# Patient Record
Sex: FEMALE | Race: WHITE | Employment: OTHER | URBAN - METROPOLITAN AREA
[De-identification: names, ages, dates, MRNs, and addresses within clinical notes are randomized per-mention and may not be internally consistent; named-entity substitution may affect disease eponyms.]

---

## 2017-01-23 ENCOUNTER — OFFICE VISIT (OUTPATIENT)
Dept: ORTHOPEDIC SURGERY | Age: 62
End: 2017-01-23

## 2017-01-23 VITALS
WEIGHT: 201.94 LBS | BODY MASS INDEX: 37.16 KG/M2 | DIASTOLIC BLOOD PRESSURE: 87 MMHG | HEIGHT: 62 IN | HEART RATE: 102 BPM | SYSTOLIC BLOOD PRESSURE: 111 MMHG

## 2017-01-23 DIAGNOSIS — M17.10 LOCALIZED OSTEOARTHROSIS, LOWER LEG: Primary | ICD-10-CM

## 2017-01-23 DIAGNOSIS — Z96.651 STATUS POST TOTAL RIGHT KNEE REPLACEMENT: ICD-10-CM

## 2017-01-23 PROCEDURE — 73560 X-RAY EXAM OF KNEE 1 OR 2: CPT | Performed by: ORTHOPAEDIC SURGERY

## 2017-01-23 PROCEDURE — 99212 OFFICE O/P EST SF 10 MIN: CPT | Performed by: ORTHOPAEDIC SURGERY

## 2017-05-01 ENCOUNTER — HOSPITAL ENCOUNTER (OUTPATIENT)
Dept: OTHER | Age: 62
Discharge: HOME | End: 2017-05-01
Attending: INTERNAL MEDICINE | Admitting: INTERNAL MEDICINE

## 2017-05-01 LAB
A/G RATIO: 1.3 (ref 1.1–2.2)
ALBUMIN SERPL-MCNC: 4.3 G/DL (ref 3.4–5)
ALP BLD-CCNC: 133 U/L (ref 40–129)
ALT SERPL-CCNC: 24 U/L (ref 10–40)
ANION GAP SERPL CALCULATED.3IONS-SCNC: 15 MMOL/L (ref 3–16)
AST SERPL-CCNC: 17 U/L (ref 15–37)
BILIRUB SERPL-MCNC: 0.3 MG/DL (ref 0–1)
BUN BLDV-MCNC: 9 MG/DL (ref 7–20)
CALCIUM SERPL-MCNC: 9.8 MG/DL (ref 8.3–10.6)
CHLORIDE BLD-SCNC: 104 MMOL/L (ref 99–110)
CHOLESTEROL, TOTAL: 214 MG/DL (ref 0–199)
CO2: 24 MMOL/L (ref 21–32)
CREAT SERPL-MCNC: 1 MG/DL (ref 0.6–1.1)
GFR AFRICAN AMERICAN: >60
GFR NON-AFRICAN AMERICAN: 57
GLOBULIN: 3.2 G/DL
GLUCOSE BLD-MCNC: 101 MG/DL (ref 70–99)
HCT VFR BLD CALC: 41.9 % (ref 36–48)
HDLC SERPL-MCNC: 55 MG/DL (ref 40–60)
HEMOGLOBIN: 13.7 G/DL (ref 12–16)
LDL CHOLESTEROL CALCULATED: 141 MG/DL
MCH RBC QN AUTO: 28 PG (ref 26–34)
MCHC RBC AUTO-ENTMCNC: 32.8 G/DL (ref 31–36)
MCV RBC AUTO: 85.4 FL (ref 80–100)
PDW BLD-RTO: 15 % (ref 12.4–15.4)
PLATELET # BLD: 232 K/UL (ref 135–450)
PMV BLD AUTO: 10.3 FL (ref 5–10.5)
POTASSIUM SERPL-SCNC: 4.2 MMOL/L (ref 3.5–5.1)
RBC # BLD: 4.91 M/UL (ref 4–5.2)
SODIUM BLD-SCNC: 143 MMOL/L (ref 136–145)
TOTAL PROTEIN: 7.5 G/DL (ref 6.4–8.2)
TRIGL SERPL-MCNC: 88 MG/DL (ref 0–150)
TSH SERPL DL<=0.05 MIU/L-ACNC: 1.03 UIU/ML (ref 0.27–4.2)
VITAMIN B-12: 344 PG/ML (ref 211–911)
VLDLC SERPL CALC-MCNC: 18 MG/DL
WBC # BLD: 10.9 K/UL (ref 4–11)

## 2017-08-14 ENCOUNTER — HOSPITAL ENCOUNTER (OUTPATIENT)
Dept: OTHER | Age: 62
Discharge: HOME | End: 2017-08-14

## 2017-08-14 DIAGNOSIS — M25.511 RIGHT SHOULDER PAIN, UNSPECIFIED CHRONICITY: ICD-10-CM

## 2018-02-22 ENCOUNTER — OFFICE VISIT (OUTPATIENT)
Dept: ORTHOPEDIC SURGERY | Age: 63
End: 2018-02-22

## 2018-02-22 VITALS
HEART RATE: 93 BPM | BODY MASS INDEX: 37.16 KG/M2 | HEIGHT: 62 IN | WEIGHT: 201.94 LBS | DIASTOLIC BLOOD PRESSURE: 97 MMHG | SYSTOLIC BLOOD PRESSURE: 160 MMHG

## 2018-02-22 DIAGNOSIS — M25.561 ACUTE PAIN OF RIGHT KNEE: Primary | ICD-10-CM

## 2018-02-22 DIAGNOSIS — M25.661 KNEE STIFFNESS, RIGHT: ICD-10-CM

## 2018-02-22 PROCEDURE — G8484 FLU IMMUNIZE NO ADMIN: HCPCS | Performed by: ORTHOPAEDIC SURGERY

## 2018-02-22 PROCEDURE — G8427 DOCREV CUR MEDS BY ELIG CLIN: HCPCS | Performed by: ORTHOPAEDIC SURGERY

## 2018-02-22 PROCEDURE — 1036F TOBACCO NON-USER: CPT | Performed by: ORTHOPAEDIC SURGERY

## 2018-02-22 PROCEDURE — G8417 CALC BMI ABV UP PARAM F/U: HCPCS | Performed by: ORTHOPAEDIC SURGERY

## 2018-02-22 PROCEDURE — 3017F COLORECTAL CA SCREEN DOC REV: CPT | Performed by: ORTHOPAEDIC SURGERY

## 2018-02-22 PROCEDURE — 99213 OFFICE O/P EST LOW 20 MIN: CPT | Performed by: ORTHOPAEDIC SURGERY

## 2018-02-22 PROCEDURE — 3014F SCREEN MAMMO DOC REV: CPT | Performed by: ORTHOPAEDIC SURGERY

## 2018-02-22 NOTE — PROGRESS NOTES
History  Kimi Boggs is a 63 y.o. female who returns for follow-up of a right total knee arthroplasty. Patient had some residual pain following her total knee arthroplasty was seen Dr. Gabe Cronin who did a geniculate nerve block 1/15/18. She reports that following that injection she is had a sensation of heaviness and limited motion in the knee with a level 10/10 pain and has had a difficult time maintaining motion in the joint. Any type of movement exacerbates her symptoms nothing at the present time is improving her discomfort. She reports that over the past year she's lost 55 pounds and was in an aggressive rehab program engaging and lunges and squats and was feeling quite good until the time of the injection. He seen today in follow-up for evaluation and treatment. .   Symptoms are has significantly worsened. Pain level today is 10/10. Treatment to date includes previous total knee arthroplasty with physical therapy. .      Patient's medications, allergies, past medical, surgical, social and family histories were reviewed and updated as appropriate. Review of Systems  Relevant review of systems reviewed and available in the patient's chart    Primary Area of CC: Examination the reveals a well-healed surgical scar in the midline. Passively I bring her up to full extension and flexion are flexor to about 115°. There is good stability medially and laterally. Patient reports that with extension she feels the pain primarily posteriorly radiating down the leg into the foot. I do not detect significant stiffness with passive movement the patient actively is having a difficult time with initiating motion. Examination proximal and distal to the injured area show good overall ROM, no bony prominence or soft tissue tenderness, no instability or excessive stiffness.             Radiology:     X-rays obtained and reviewed in office:  Views AP lateral skyline  Location right knee  Impression there is

## 2018-02-22 NOTE — PATIENT INSTRUCTIONS
you can put on your leg. Use a cane, crutches, or a walker as instructed. · Follow your doctor's instructions about activity during your healing process. If you can do mild exercise, slowly increase your activity. · Reach and stay at a healthy weight. Extra weight can strain the joints, especially the knees and hips, and make the pain worse. Losing even a few pounds may help. When should you call for help? Call 911 anytime you think you may need emergency care. For example, call if:  ? · You have symptoms of a blood clot in your lung (called a pulmonary embolism). These may include:  ¨ Sudden chest pain. ¨ Trouble breathing. ¨ Coughing up blood. ?Call your doctor now or seek immediate medical care if:  ? · You have severe or increasing pain. ? · Your leg or foot turns cold or changes color. ? · You cannot stand or put weight on your knee. ? · Your knee looks twisted or bent out of shape. ? · You cannot move your knee. ? · You have signs of infection, such as:  ¨ Increased pain, swelling, warmth, or redness. ¨ Red streaks leading from the knee. ¨ Pus draining from a place on your knee. ¨ A fever. ? · You have signs of a blood clot in your leg (called a deep vein thrombosis), such as:  ¨ Pain in your calf, back of the knee, thigh, or groin. ¨ Redness and swelling in your leg or groin. ? Watch closely for changes in your health, and be sure to contact your doctor if:  ? · You have tingling, weakness, or numbness in your knee. ? · You have any new symptoms, such as swelling. ? · You have bruises from a knee injury that last longer than 2 weeks. ? · You do not get better as expected. Where can you learn more? Go to https://[employer]. org and sign in to your [employer] account. Enter K195 in the Query Hunter box to learn more about \"Knee Pain or Injury: Care Instructions. \"     If you do not have an account, please click on the \"Sign Up Now\" link.   Current as of:

## 2018-03-01 ENCOUNTER — HOSPITAL ENCOUNTER (OUTPATIENT)
Dept: PHYSICAL THERAPY | Age: 63
Discharge: HOME | End: 2018-03-31
Attending: ORTHOPAEDIC SURGERY | Admitting: ORTHOPAEDIC SURGERY

## 2019-07-10 ENCOUNTER — OFFICE VISIT (OUTPATIENT)
Dept: ORTHOPEDIC SURGERY | Age: 64
End: 2019-07-10

## 2019-07-10 VITALS
HEART RATE: 71 BPM | SYSTOLIC BLOOD PRESSURE: 119 MMHG | HEIGHT: 62 IN | WEIGHT: 201.94 LBS | BODY MASS INDEX: 37.16 KG/M2 | DIASTOLIC BLOOD PRESSURE: 67 MMHG

## 2019-07-10 DIAGNOSIS — M17.12 PRIMARY OSTEOARTHRITIS OF LEFT KNEE: Primary | ICD-10-CM

## 2019-07-10 DIAGNOSIS — R52 PAIN: ICD-10-CM

## 2019-07-10 PROCEDURE — 99213 OFFICE O/P EST LOW 20 MIN: CPT | Performed by: ORTHOPAEDIC SURGERY

## 2019-07-10 PROCEDURE — 20610 DRAIN/INJ JOINT/BURSA W/O US: CPT | Performed by: ORTHOPAEDIC SURGERY

## 2020-08-27 ENCOUNTER — HOSPITAL ENCOUNTER (INPATIENT)
Age: 65
LOS: 3 days | Discharge: HOME | DRG: 872 | End: 2020-08-30
Attending: EMERGENCY MEDICINE | Admitting: INTERNAL MEDICINE
Payer: MEDICARE

## 2020-08-27 ENCOUNTER — APPOINTMENT (OUTPATIENT)
Dept: GENERAL RADIOLOGY | Age: 65
DRG: 872 | End: 2020-08-27
Payer: MEDICARE

## 2020-08-27 ENCOUNTER — APPOINTMENT (OUTPATIENT)
Dept: CT IMAGING | Age: 65
DRG: 872 | End: 2020-08-27
Payer: MEDICARE

## 2020-08-27 LAB
A/G RATIO: 1.4 (ref 1.1–2.2)
ALBUMIN SERPL-MCNC: 4.3 G/DL (ref 3.4–5)
ALP BLD-CCNC: 112 U/L (ref 40–129)
ALT SERPL-CCNC: 11 U/L (ref 10–40)
ANION GAP SERPL CALCULATED.3IONS-SCNC: 12 MMOL/L (ref 3–16)
AST SERPL-CCNC: 10 U/L (ref 15–37)
BASOPHILS ABSOLUTE: 0 K/UL (ref 0–0.2)
BASOPHILS RELATIVE PERCENT: 0 %
BILIRUB SERPL-MCNC: 0.8 MG/DL (ref 0–1)
BILIRUBIN URINE: NEGATIVE
BLOOD, URINE: ABNORMAL
BUN BLDV-MCNC: 8 MG/DL (ref 7–20)
CALCIUM SERPL-MCNC: 10 MG/DL (ref 8.3–10.6)
CHLORIDE BLD-SCNC: 98 MMOL/L (ref 99–110)
CLARITY: ABNORMAL
CO2: 26 MMOL/L (ref 21–32)
COLOR: YELLOW
CREAT SERPL-MCNC: 0.9 MG/DL (ref 0.6–1.2)
EKG ATRIAL RATE: 108 BPM
EKG DIAGNOSIS: NORMAL
EKG P AXIS: 55 DEGREES
EKG P-R INTERVAL: 166 MS
EKG Q-T INTERVAL: 316 MS
EKG QRS DURATION: 78 MS
EKG QTC CALCULATION (BAZETT): 423 MS
EKG R AXIS: 53 DEGREES
EKG T AXIS: 23 DEGREES
EKG VENTRICULAR RATE: 108 BPM
EOSINOPHILS ABSOLUTE: 0 K/UL (ref 0–0.6)
EOSINOPHILS RELATIVE PERCENT: 0 %
EPITHELIAL CELLS, UA: ABNORMAL /HPF (ref 0–5)
GFR AFRICAN AMERICAN: >60
GFR NON-AFRICAN AMERICAN: >60
GLOBULIN: 3 G/DL
GLUCOSE BLD-MCNC: 187 MG/DL (ref 70–99)
GLUCOSE URINE: NEGATIVE MG/DL
HCT VFR BLD CALC: 44.2 % (ref 36–48)
HEMOGLOBIN: 14.7 G/DL (ref 12–16)
HYALINE CASTS: ABNORMAL /LPF (ref 0–2)
KETONES, URINE: NEGATIVE MG/DL
LACTIC ACID, SEPSIS: 3.7 MMOL/L (ref 0.4–1.9)
LEUKOCYTE ESTERASE, URINE: NEGATIVE
LIPASE: 18 U/L (ref 13–60)
LYMPHOCYTES ABSOLUTE: 1.4 K/UL (ref 1–5.1)
LYMPHOCYTES RELATIVE PERCENT: 7 %
MCH RBC QN AUTO: 29.1 PG (ref 26–34)
MCHC RBC AUTO-ENTMCNC: 33.2 G/DL (ref 31–36)
MCV RBC AUTO: 87.7 FL (ref 80–100)
MICROSCOPIC EXAMINATION: YES
MONOCYTES ABSOLUTE: 1.4 K/UL (ref 0–1.3)
MONOCYTES RELATIVE PERCENT: 7 %
NEUTROPHILS ABSOLUTE: 16.6 K/UL (ref 1.7–7.7)
NEUTROPHILS RELATIVE PERCENT: 86 %
NITRITE, URINE: NEGATIVE
PDW BLD-RTO: 15 % (ref 12.4–15.4)
PH UA: 7.5 (ref 5–8)
PLATELET # BLD: 170 K/UL (ref 135–450)
PLATELET SLIDE REVIEW: ADEQUATE
PMV BLD AUTO: 10.2 FL (ref 5–10.5)
POTASSIUM REFLEX MAGNESIUM: 4.2 MMOL/L (ref 3.5–5.1)
PROTEIN UA: ABNORMAL MG/DL
RBC # BLD: 5.04 M/UL (ref 4–5.2)
RBC UA: ABNORMAL /HPF (ref 0–4)
SLIDE REVIEW: ABNORMAL
SODIUM BLD-SCNC: 136 MMOL/L (ref 136–145)
SPECIFIC GRAVITY UA: 1.01 (ref 1–1.03)
TOTAL PROTEIN: 7.3 G/DL (ref 6.4–8.2)
TROPONIN: <0.01 NG/ML
URINE TYPE: ABNORMAL
UROBILINOGEN, URINE: 0.2 E.U./DL
WBC # BLD: 19.3 K/UL (ref 4–11)
WBC UA: ABNORMAL /HPF (ref 0–5)
YEAST: PRESENT /HPF

## 2020-08-27 PROCEDURE — 6360000004 HC RX CONTRAST MEDICATION: Performed by: PHYSICIAN ASSISTANT

## 2020-08-27 PROCEDURE — 2580000003 HC RX 258: Performed by: NURSE PRACTITIONER

## 2020-08-27 PROCEDURE — 96375 TX/PRO/DX INJ NEW DRUG ADDON: CPT

## 2020-08-27 PROCEDURE — 93005 ELECTROCARDIOGRAM TRACING: CPT | Performed by: PHYSICIAN ASSISTANT

## 2020-08-27 PROCEDURE — 6360000002 HC RX W HCPCS: Performed by: NURSE PRACTITIONER

## 2020-08-27 PROCEDURE — 2580000003 HC RX 258: Performed by: EMERGENCY MEDICINE

## 2020-08-27 PROCEDURE — 99221 1ST HOSP IP/OBS SF/LOW 40: CPT | Performed by: NURSE PRACTITIONER

## 2020-08-27 PROCEDURE — 85025 COMPLETE CBC W/AUTO DIFF WBC: CPT

## 2020-08-27 PROCEDURE — 2500000003 HC RX 250 WO HCPCS: Performed by: NURSE PRACTITIONER

## 2020-08-27 PROCEDURE — 96365 THER/PROPH/DIAG IV INF INIT: CPT

## 2020-08-27 PROCEDURE — 71045 X-RAY EXAM CHEST 1 VIEW: CPT

## 2020-08-27 PROCEDURE — 6370000000 HC RX 637 (ALT 250 FOR IP): Performed by: PHYSICIAN ASSISTANT

## 2020-08-27 PROCEDURE — 6370000000 HC RX 637 (ALT 250 FOR IP): Performed by: NURSE PRACTITIONER

## 2020-08-27 PROCEDURE — 6360000002 HC RX W HCPCS: Performed by: EMERGENCY MEDICINE

## 2020-08-27 PROCEDURE — 83690 ASSAY OF LIPASE: CPT

## 2020-08-27 PROCEDURE — 93010 ELECTROCARDIOGRAM REPORT: CPT | Performed by: INTERNAL MEDICINE

## 2020-08-27 PROCEDURE — 6360000002 HC RX W HCPCS: Performed by: PHYSICIAN ASSISTANT

## 2020-08-27 PROCEDURE — 1200000000 HC SEMI PRIVATE

## 2020-08-27 PROCEDURE — 87086 URINE CULTURE/COLONY COUNT: CPT

## 2020-08-27 PROCEDURE — 80053 COMPREHEN METABOLIC PANEL: CPT

## 2020-08-27 PROCEDURE — 99285 EMERGENCY DEPT VISIT HI MDM: CPT

## 2020-08-27 PROCEDURE — 99253 IP/OBS CNSLTJ NEW/EST LOW 45: CPT | Performed by: SURGERY

## 2020-08-27 PROCEDURE — 83605 ASSAY OF LACTIC ACID: CPT

## 2020-08-27 PROCEDURE — 84484 ASSAY OF TROPONIN QUANT: CPT

## 2020-08-27 PROCEDURE — 87040 BLOOD CULTURE FOR BACTERIA: CPT

## 2020-08-27 PROCEDURE — 74177 CT ABD & PELVIS W/CONTRAST: CPT

## 2020-08-27 PROCEDURE — 81001 URINALYSIS AUTO W/SCOPE: CPT

## 2020-08-27 PROCEDURE — 2580000003 HC RX 258: Performed by: PHYSICIAN ASSISTANT

## 2020-08-27 RX ORDER — FLUCONAZOLE 100 MG/1
150 TABLET ORAL ONCE
Status: COMPLETED | OUTPATIENT
Start: 2020-08-27 | End: 2020-08-27

## 2020-08-27 RX ORDER — SODIUM CHLORIDE 9 MG/ML
INJECTION, SOLUTION INTRAVENOUS CONTINUOUS
Status: DISCONTINUED | OUTPATIENT
Start: 2020-08-27 | End: 2020-08-30 | Stop reason: HOSPADM

## 2020-08-27 RX ORDER — ONDANSETRON 2 MG/ML
4 INJECTION INTRAMUSCULAR; INTRAVENOUS EVERY 6 HOURS PRN
Status: DISCONTINUED | OUTPATIENT
Start: 2020-08-27 | End: 2020-08-30 | Stop reason: HOSPADM

## 2020-08-27 RX ORDER — MORPHINE SULFATE 4 MG/ML
4 INJECTION, SOLUTION INTRAMUSCULAR; INTRAVENOUS ONCE
Status: COMPLETED | OUTPATIENT
Start: 2020-08-27 | End: 2020-08-27

## 2020-08-27 RX ORDER — ONDANSETRON 2 MG/ML
4 INJECTION INTRAMUSCULAR; INTRAVENOUS ONCE
Status: COMPLETED | OUTPATIENT
Start: 2020-08-27 | End: 2020-08-27

## 2020-08-27 RX ORDER — POTASSIUM CHLORIDE 7.45 MG/ML
10 INJECTION INTRAVENOUS PRN
Status: DISCONTINUED | OUTPATIENT
Start: 2020-08-27 | End: 2020-08-30 | Stop reason: HOSPADM

## 2020-08-27 RX ORDER — 0.9 % SODIUM CHLORIDE 0.9 %
1700 INTRAVENOUS SOLUTION INTRAVENOUS ONCE
Status: COMPLETED | OUTPATIENT
Start: 2020-08-27 | End: 2020-08-27

## 2020-08-27 RX ORDER — ACETAMINOPHEN 325 MG/1
650 TABLET ORAL EVERY 6 HOURS PRN
Status: DISCONTINUED | OUTPATIENT
Start: 2020-08-27 | End: 2020-08-30 | Stop reason: HOSPADM

## 2020-08-27 RX ORDER — CIPROFLOXACIN 2 MG/ML
400 INJECTION, SOLUTION INTRAVENOUS EVERY 12 HOURS
Status: DISCONTINUED | OUTPATIENT
Start: 2020-08-27 | End: 2020-08-30 | Stop reason: HOSPADM

## 2020-08-27 RX ORDER — SODIUM CHLORIDE 0.9 % (FLUSH) 0.9 %
10 SYRINGE (ML) INJECTION EVERY 12 HOURS SCHEDULED
Status: DISCONTINUED | OUTPATIENT
Start: 2020-08-27 | End: 2020-08-30 | Stop reason: HOSPADM

## 2020-08-27 RX ORDER — SODIUM CHLORIDE 0.9 % (FLUSH) 0.9 %
10 SYRINGE (ML) INJECTION PRN
Status: DISCONTINUED | OUTPATIENT
Start: 2020-08-27 | End: 2020-08-30 | Stop reason: HOSPADM

## 2020-08-27 RX ORDER — POTASSIUM CHLORIDE 20 MEQ/1
40 TABLET, EXTENDED RELEASE ORAL PRN
Status: DISCONTINUED | OUTPATIENT
Start: 2020-08-27 | End: 2020-08-30 | Stop reason: HOSPADM

## 2020-08-27 RX ORDER — PROMETHAZINE HYDROCHLORIDE 25 MG/1
12.5 TABLET ORAL EVERY 6 HOURS PRN
Status: DISCONTINUED | OUTPATIENT
Start: 2020-08-27 | End: 2020-08-30 | Stop reason: HOSPADM

## 2020-08-27 RX ORDER — ACETAMINOPHEN 650 MG/1
650 SUPPOSITORY RECTAL EVERY 6 HOURS PRN
Status: DISCONTINUED | OUTPATIENT
Start: 2020-08-27 | End: 2020-08-30 | Stop reason: HOSPADM

## 2020-08-27 RX ORDER — 0.9 % SODIUM CHLORIDE 0.9 %
1000 INTRAVENOUS SOLUTION INTRAVENOUS ONCE
Status: COMPLETED | OUTPATIENT
Start: 2020-08-27 | End: 2020-08-27

## 2020-08-27 RX ORDER — MORPHINE SULFATE 4 MG/ML
4 INJECTION, SOLUTION INTRAMUSCULAR; INTRAVENOUS
Status: DISCONTINUED | OUTPATIENT
Start: 2020-08-27 | End: 2020-08-30 | Stop reason: HOSPADM

## 2020-08-27 RX ORDER — POLYETHYLENE GLYCOL 3350 17 G/17G
17 POWDER, FOR SOLUTION ORAL DAILY PRN
Status: DISCONTINUED | OUTPATIENT
Start: 2020-08-27 | End: 2020-08-28

## 2020-08-27 RX ADMIN — SODIUM CHLORIDE 1700 ML: 9 INJECTION, SOLUTION INTRAVENOUS at 13:26

## 2020-08-27 RX ADMIN — IOPAMIDOL 75 ML: 755 INJECTION, SOLUTION INTRAVENOUS at 13:13

## 2020-08-27 RX ADMIN — ONDANSETRON HYDROCHLORIDE 4 MG: 2 INJECTION, SOLUTION INTRAMUSCULAR; INTRAVENOUS at 12:07

## 2020-08-27 RX ADMIN — MORPHINE SULFATE 4 MG: 4 INJECTION INTRAVENOUS at 21:55

## 2020-08-27 RX ADMIN — METRONIDAZOLE 500 MG: 500 INJECTION, SOLUTION INTRAVENOUS at 17:42

## 2020-08-27 RX ADMIN — CIPROFLOXACIN 400 MG: 2 INJECTION, SOLUTION INTRAVENOUS at 17:42

## 2020-08-27 RX ADMIN — ENOXAPARIN SODIUM 40 MG: 40 INJECTION SUBCUTANEOUS at 17:43

## 2020-08-27 RX ADMIN — PIPERACILLIN SODIUM AND TAZOBACTAM SODIUM 3.38 G: 3; .375 INJECTION, POWDER, LYOPHILIZED, FOR SOLUTION INTRAVENOUS at 13:22

## 2020-08-27 RX ADMIN — VANCOMYCIN HYDROCHLORIDE 1.5 G: 1 INJECTION, POWDER, LYOPHILIZED, FOR SOLUTION INTRAVENOUS at 14:04

## 2020-08-27 RX ADMIN — ACETAMINOPHEN 650 MG: 325 TABLET ORAL at 16:34

## 2020-08-27 RX ADMIN — MORPHINE SULFATE 4 MG: 4 INJECTION INTRAVENOUS at 17:42

## 2020-08-27 RX ADMIN — MORPHINE SULFATE 4 MG: 4 INJECTION INTRAVENOUS at 12:11

## 2020-08-27 RX ADMIN — FLUCONAZOLE 150 MG: 100 TABLET ORAL at 15:45

## 2020-08-27 RX ADMIN — SODIUM CHLORIDE 1000 ML: 9 INJECTION, SOLUTION INTRAVENOUS at 12:11

## 2020-08-27 RX ADMIN — SODIUM CHLORIDE: 9 INJECTION, SOLUTION INTRAVENOUS at 17:42

## 2020-08-27 ASSESSMENT — ENCOUNTER SYMPTOMS
COUGH: 1
NAUSEA: 1
ABDOMINAL PAIN: 1
DIARRHEA: 0
CONSTIPATION: 0
VOMITING: 1
SHORTNESS OF BREATH: 0

## 2020-08-27 ASSESSMENT — PAIN SCALES - GENERAL
PAINLEVEL_OUTOF10: 10
PAINLEVEL_OUTOF10: 8
PAINLEVEL_OUTOF10: 4
PAINLEVEL_OUTOF10: 7

## 2020-08-27 NOTE — H&P
Hospital Medicine History & Physical      PCP: Ally Mccallum MD    Date of Admission: 8/27/2020    Date of Service: Pt seen/examined on 8/27/2020     Chief Complaint:    Chief Complaint   Patient presents with    Abdominal Pain       History Of Present Illness: The patient is a 63 y.o. female with COPD/asthma, chronic back pain, anxiety, and h/o diverticulitis with perforation who presents to [address] [place] [address] with c/o upper abdominal pain. Ongoing for the last 3 days. The pain worsened today. She states the pain is constant, severe, and sore. Other associated symptoms are nausea and vomiting. She has had low grade fevers as well. She denies cough, chest pain, dyspnea, diarrhea, or dysuria. Low grade temp noted. Labs with leukocytosis and lactic acidosis. CT abdomen/pelvis with diverticulitis proximal transverse colon with large diverticulum versus contained perforation along posterior margin of the colon. Patient admitted to med-surg. Past Medical History:        Diagnosis Date    Back pain     COPD (chronic obstructive pulmonary disease) (HCC)     Diverticulitis     Localized osteoarthrosis, lower leg 10/15/2015       Past Surgical History:        Procedure Laterality Date    COLECTOMY  9/18/2014    sigmoid    COLONOSCOPY      HYSTERECTOMY      JOINT REPLACEMENT Right 09/15/2016    RIGHT TOTAL KNEE REPLACEMENT     KNEE SURGERY         Medications Prior to Admission:    Prior to Admission medications    Medication Sig Start Date End Date Taking? Authorizing Provider   tiZANidine (ZANAFLEX) 4 MG tablet Take 6 mg by mouth three times daily    Yes Historical Provider, MD       Allergies:  Mobic [meloxicam] and Vicodin [hydrocodone-acetaminophen]    Social History:     TOBACCO:   reports that she has never smoked. She has never used smokeless tobacco.  ETOH:   reports no history of alcohol use.       Family History:   Positive as follows:        Problem Relation Age of Onset    Diabetes pending  Urine: pending     EKG:  I have reviewed the EKG with the following interpretation:   Sinus tachycardia with Premature supraventricular complexes, Nonspecific T wave abnormality    RADIOLOGY  CT ABDOMEN PELVIS W IV CONTRAST Additional Contrast? None   Final Result   Findings are suggestive of diverticulitis of the proximal transverse colon   with a large diverticulum versus contained perforation along the posterior   margin of the colon. Marked associated mural thickening, inflammatory   stranding, and small amount of adjacent fluid. Thickening of the adjacent   duodenum, likely reactive. Follow-up to resolution recommended. 2.2 cm complex lesion within the spleen is larger than prior examination. Some considerations include complex cyst, hemangioma, less commonly   malignancy. In the nonacute setting, MR could be considered for further   characterization as warranted. Urothelial enhancement of the right renal pelvis. If  infection is of   consideration, suggest correlation with urinalysis. Hiatal hernia. XR CHEST PORTABLE   Final Result   No radiographic evidence of acute pulmonary disease. Active Problems:    Diverticulitis  Resolved Problems:    * No resolved hospital problems.  *        ASSESSMENT/PLAN:  Diverticulitis  - patient presented with c/o abdominal pain, nausea, vomiting  - CT abdomen/pelvis with diverticulitis of the proximal transverse colon with large diverticulum versus contained perforation along the posterior margin.  - admitted to med-surg  - General surgery was consulted in the ED  - NPO, IVFs  - antiemetics as needed  - pain control: morphine as needed  - IV Cipro, Flagyl D#1    Sepsis, POA  - fever, leukocytosis, lactic acidosis  - due to Diverticulitis  - antibiotics, IVFs as above  - blood cx pending  - monitor for improvement    Splenic lesion  - 2.2 cm complex lesion  - needs further workup outpatient    UTI  - cover with Cipro as above  - f/u on urine cx. Hypertension  - BP elevated. Possibly due to pain  - monitor for now  - not on any home medications. Chronic back pain  - Holding home Zanaflex    COPD/asthma  - stable. No AE  - order home inhalers. Obesity  - Body mass index is 35.67 kg/m². - Recommended weight loss    Patient reports to me that she took herself off of her medications. She weaned herself off of Xanax over a year ago. She also stopped using Narcotic pain medications.       DVT Prophylaxis: Lovenox  Diet: NPO  Code Status: Full Code    Media Aron FNP-C  8/27/2020

## 2020-08-27 NOTE — ED PROVIDER NOTES
[address] ED  EMERGENCY DEPARTMENT ENCOUNTER        Pt Name: Quan Herndon  MRN: [id number]  [place] a 1955  Date of evaluation: 8/27/2020  Provider: Janina Moreno PA-C  PCP: Aris Lau MD    Shared Visit or Autonomous Visit:  I have seen and evaluated this patient with my supervising physician Kamilah Ellis MD.    [address] [place] [address]       Chief Complaint   Patient presents with    Abdominal Pain       HISTORY OF PRESENT ILLNESS   (Location/Symptom, Timing/Onset, Context/Setting, Quality, Duration, Modifying Factors, Severity)  Note limiting factors. Juan Parker is a 63 y.o. female presenting to the emergency department with complaint of generalized abdominal pain worse in the upper abdomen symptoms for the past 3 days has had nausea and vomited x1 here on arrival.  Denies any diarrhea or constipation. States pain came on suddenly and has worsened. History of diverticulitis with perforation 2015. Low-grade fever. States she has had a cough since September of last year she is on 3 different inhalers. Denies any chest pain. The history is provided by the patient. Abdominal Pain   Pain location:  Generalized  Pain quality: aching    Pain radiates to:  Does not radiate  Onset quality:  Sudden  Duration:  3 days  Timing:  Constant  Progression:  Worsening  Chronicity:  New  Worsened by:  Position changes and palpation  Associated symptoms: chills, cough, fever, nausea and vomiting    Associated symptoms: no chest pain, no constipation, no diarrhea, no dysuria and no shortness of breath          Nursing Notes were reviewed    REVIEW OF SYSTEMS    (2-9 systems for level 4, 10 or more for level 5)     Review of Systems   Constitutional: Positive for chills and fever. Respiratory: Positive for cough. Negative for shortness of breath. Cardiovascular: Negative for chest pain and leg swelling. Gastrointestinal: Positive for abdominal pain, nausea and vomiting. Negative for constipation and diarrhea. Genitourinary: Negative for difficulty urinating and dysuria. Neurological: Negative for syncope. All other systems reviewed and are negative. Positives and Pertinent negatives as per HPI.        PAST MEDICAL HISTORY     Past Medical History:   Diagnosis Date    Back pain     COPD (chronic obstructive pulmonary disease) ([facility] [address]     Diverticulitis     Localized osteoarthrosis, lower leg 10/15/2015         SURGICAL HISTORY     Past Surgical History:   Procedure Laterality Date    COLECTOMY  9/18/2014    sigmoid    COLONOSCOPY      HYSTERECTOMY      JOINT REPLACEMENT Right 09/15/2016    RIGHT TOTAL KNEE REPLACEMENT     KNEE SURGERY           CURRENTMEDICATIONS       Previous Medications    TIZANIDINE (ZANAFLEX) 4 MG TABLET    Take 6 mg by mouth three times daily          ALLERGIES     Mobic [meloxicam] and Vicodin [hydrocodone-acetaminophen]    FAMILYHISTORY       Family History   Problem Relation Age of Onset    Diabetes Mother     Cancer Mother     Diabetes Father     Cancer Father         Skin CA - type unsure per PT           SOCIAL HISTORY       Social History     Socioeconomic History    Marital status: Legally      Spouse name: None    Number of children: None    Years of education: None    Highest education level: None   Occupational History    None   Social Needs    Financial resource strain: None    Food insecurity     Worry: None     Inability: None    Transportation needs     Medical: None     Non-medical: None   Tobacco Use    Smoking status: Never Smoker    Smokeless tobacco: Never Used   Substance and Sexual Activity    Alcohol use: No    Drug use: No    Sexual activity: Yes   Lifestyle    Physical activity     Days per week: None     Minutes per session: None    Stress: None   Relationships    Social connections     Talks on phone: None     Gets together: None     Attends Buddhism service: None     Active member of club or organization: None     Attends meetings of clubs or organizations: None     Relationship status: None    Intimate partner violence     Fear of current or ex partner: None     Emotionally abused: None     Physically abused: None     Forced sexual activity: None   Other Topics Concern    None   Social History Narrative    None       SCREENINGS             PHYSICAL EXAM    (up to 7 for level 4, 8 or more for level 5)     ED Triage Vitals [08/27/20 1132]   BP Temp Temp src Pulse Resp SpO2 Height Weight   (!) 162/82 100.6 °F (38.1 °C) -- 92 20 100 % 5' 2\" (1.575 m) 195 lb (88.5 kg)       Physical Exam  Vitals signs and nursing note reviewed. Constitutional:       Appearance: She is well-developed. Comments: appears uncomfortable   HENT:      Head: Normocephalic and atraumatic. Mouth/Throat:      Mouth: Mucous membranes are moist.      Pharynx: Oropharynx is clear. No pharyngeal swelling, oropharyngeal exudate or posterior oropharyngeal erythema. Eyes:      Conjunctiva/sclera: Conjunctivae normal.      Pupils: Pupils are equal, round, and reactive to light. Neck:      Musculoskeletal: Normal range of motion and neck supple. Vascular: No JVD. Cardiovascular:      Rate and Rhythm: Regular rhythm. Tachycardia present. Pulses: Normal pulses. Radial pulses are 2+ on the right side and 2+ on the left side. Posterior tibial pulses are 2+ on the right side and 2+ on the left side. Heart sounds: Normal heart sounds. Pulmonary:      Effort: Pulmonary effort is normal. No respiratory distress. Breath sounds: No stridor. Wheezing (mild expiratory) and rhonchi (bases) present. No rales. Abdominal:      General: Bowel sounds are normal. There is no distension. Palpations: Abdomen is soft. Abdomen is not rigid. There is no mass. Tenderness: There is generalized abdominal tenderness. There is no guarding or rebound. Musculoskeletal: Normal range of motion. Right lower leg: No edema. Left lower leg: No edema. Skin:     General: Skin is warm and dry. Findings: No rash. Neurological:      Mental Status: She is alert and oriented to person, place, and time. GCS: GCS eye subscore is 4. GCS verbal subscore is 5. GCS motor subscore is 6. Cranial Nerves: No cranial nerve deficit. Sensory: No sensory deficit. Motor: No abnormal muscle tone.       Coordination: Coordination normal.   Psychiatric:         Behavior: Behavior normal.         DIAGNOSTIC RESULTS   LABS:    Labs Reviewed   CBC WITH AUTO DIFFERENTIAL - Abnormal; Notable for the following components:       Result Value    WBC 19.3 (*)     Neutrophils Absolute 16.6 (*)     Monocytes Absolute 1.4 (*)     All other components within normal limits    Narrative:     Performed at:  [facility],  [email or web address]   Phone [phone]   COMPREHENSIVE METABOLIC PANEL W/ REFLEX TO MG FOR LOW K - Abnormal; Notable for the following components:    Chloride 98 (*)     Glucose 187 (*)     AST 10 (*)     All other components within normal limits    Narrative:     Performed at:  [facility],  [email or web address]   Phone [phone]   URINALYSIS - Abnormal; Notable for the following components:    Clarity, UA SL CLOUDY (*)     Blood, Urine MODERATE (*)     Protein, UA TRACE (*)     All other components within normal limits    Narrative:     Performed at:  [facility],  [email or web address]   Phone [phone]   LACTATE, SEPSIS - Abnormal; Notable for the following components:    Lactic Acid, Sepsis 3.7 (*)     All other components within normal limits    Narrative:     Performed at:  [facility],  [employer]   Phone [phone]   MICROSCOPIC URINALYSIS - Abnormal; Notable for the following components:    RBC, UA 11-20 (*)     Epithelial Cells, UA 11-20 (*)     Yeast, UA Present (*)     All other components within normal limits    Narrative:     Performed at:  [facility],  [place], [facility]   Phone [phone]   CULTURE, BLOOD 1   CULTURE, BLOOD 2   LIPASE    Narrative:     Performed at:  [place] [address],  [employer], [facility]   Phone [phone]   TROPONIN    Narrative:     Performed at:  [place] [address],  [place], [facility]   Phone [phone]   LACTATE, SEPSIS     Results for orders placed or performed during the hospital encounter of 08/27/20   CBC auto differential   Result Value Ref Range    WBC 19.3 (H) 4.0 - 11.0 K/uL    RBC 5.04 4.00 - 5.20 M/uL    Hemoglobin 14.7 12.0 - 16.0 g/dL    Hematocrit 44.2 36.0 - 48.0 %    MCV 87.7 80.0 - 100.0 fL    MCH 29.1 26.0 - 34.0 pg    MCHC 33.2 31.0 - 36.0 g/dL    RDW 15.0 12.4 - 15.4 %    Platelets 589 820 - 505 K/uL    MPV 10.2 5.0 - 10.5 fL    PLATELET SLIDE REVIEW Adequate     SLIDE REVIEW see below     Neutrophils % 86.0 %    Lymphocytes % 7.0 %    Monocytes % 7.0 %    Eosinophils % 0.0 %    Basophils % 0.0 %    Neutrophils Absolute 16.6 (H) 1.7 - 7.7 K/uL    Lymphocytes Absolute 1.4 1.0 - 5.1 K/uL    Monocytes Absolute 1.4 (H) 0.0 - 1.3 K/uL    Eosinophils Absolute 0.0 0.0 - 0.6 K/uL    Basophils Absolute 0.0 0.0 - 0.2 K/uL   Comprehensive Metabolic Panel w/ Reflex to MG   Result Value Ref Range    Sodium 136 136 - 145 mmol/L    Potassium reflex Magnesium 4.2 3.5 - 5.1 mmol/L    Chloride 98 (L) 99 - 110 mmol/L    CO2 26 21 - 32 mmol/L    Anion Gap 12 3 - 16    Glucose 187 (H) 70 - 99 mg/dL    BUN 8 7 - 20 mg/dL    CREATININE 0.9 0.6 - 1.2 mg/dL    GFR Non-African American >60 >60    GFR African American >60 >60    Calcium 10.0 8.3 - 10.6 mg/dL    Total Protein 7.3 6.4 - 8.2 g/dL Alb 4.3 3.4 - 5.0 g/dL    Albumin/Globulin Ratio 1.4 1.1 - 2.2    Total Bilirubin 0.8 0.0 - 1.0 mg/dL    Alkaline Phosphatase 112 40 - 129 U/L    ALT 11 10 - 40 U/L    AST 10 (L) 15 - 37 U/L    Globulin 3.0 g/dL   Urinalysis, reflex to microscopic   Result Value Ref Range    Color, UA Yellow Straw/Yellow    Clarity, UA SL CLOUDY (A) Clear    Glucose, Ur Negative Negative mg/dL    Bilirubin Urine Negative Negative    Ketones, Urine Negative Negative mg/dL    Specific Gravity, UA 1.015 1.005 - 1.030    Blood, Urine MODERATE (A) Negative    pH, UA 7.5 5.0 - 8.0    Protein, UA TRACE (A) Negative mg/dL    Urobilinogen, Urine 0.2 <2.0 E.U./dL    Nitrite, Urine Negative Negative    Leukocyte Esterase, Urine Negative Negative    Microscopic Examination YES     Urine Type NotGiven    Lipase   Result Value Ref Range    Lipase 18.0 13.0 - 60.0 U/L   Troponin   Result Value Ref Range    Troponin <0.01 <0.01 ng/mL   Lactate, Sepsis   Result Value Ref Range    Lactic Acid, Sepsis 3.7 (H) 0.4 - 1.9 mmol/L   Microscopic Urinalysis   Result Value Ref Range    Hyaline Casts, UA 0-2 0 - 2 /LPF    WBC, UA 0-2 0 - 5 /HPF    RBC, UA 11-20 (A) 0 - 4 /HPF    Epithelial Cells, UA 11-20 (A) 0 - 5 /HPF    Yeast, UA Present (A) None Seen /HPF   EKG 12 Lead   Result Value Ref Range    Ventricular Rate 108 BPM    Atrial Rate 108 BPM    P-R Interval 166 ms    QRS Duration 78 ms    Q-T Interval 316 ms    QTc Calculation (Bazett) 423 ms    P Axis 55 degrees    R Axis 53 degrees    T Axis 23 degrees    Diagnosis       Sinus tachycardia with Premature supraventricular complexesNonspecific T wave abnormalityAbnormal ECGNo previous ECGs availableConfirmed by DEBORA GOEL MD ([id number]) on 8/27/2020 1:06:01 PM         All other labs were within normal range or not returned as of this dictation. EKG:  All EKG's are interpreted by the Emergency Department Physician in the absence of a cardiologist.  Please see their note for interpretation of LIKE COLON RUPTURED Acuity: Unknown Type of Exam: Unknown FINDINGS: Lower Chest: Basilar atelectasis. Organs: 9 mm low-density lesion along the lateral right hepatic lobe is too small to characterize. Additional 5 mm low-density lesion within the left hepatic lobe is also too small to characterize. No intrahepatic ductal dilatation. Gallbladder is present. Hypodense lesion is seen within the mid spleen measuring 2.1 x 2.2 cm, Hounsfield units 55. Previously, this measured 1.2 x 0.9 cm. Small hiatal hernia. Stomach is decompressed. There is inflammatory stranding near the gastric antrum and proximal duodenum, likely secondary to the abdominal process described below. No pancreatic ductal dilatation or stranding. Adrenal glands are within normal limits. Urothelial enhancement is demonstrated of the right renal pelvis. Low-density lesions within the kidneys are too small to characterize. Retroaortic left renal vein. Calcification of the abdominal aorta. GI/Bowel: Anastomosis is seen at the sigmoid from prior surgery. Diverticulosis is present. At the proximal transverse colon, marked mural thickening is demonstrated of the colon and there is marked adjacent inflammatory stranding. Complex air collection is seen along the posterior margin of the transverse colon measuring approximately 2.2 x 2.0 cm, suggestive of large diverticulum with possible micro perforation. Free fluid is seen within this region. The appendix is within normal limits in caliber. Small bowel is nondilated. Fat containing paraumbilical hernia and ventral hernia to the right of midline at the level of the umbilicus. Fat containing ventral hernia at the upper abdominal midline. Pelvis: Small amount of free pelvic fluid. Pelvic phleboliths. Uterus is absent. Bladder is grossly unremarkable. No inguinal adenopathy. Bones/Soft Tissues:  Mottled lucency is again demonstrated of the regional skeleton, similar to prior, potentially on the

## 2020-08-27 NOTE — ED NOTES
Bed: 17  Expected date:   Expected time:   Means of arrival:   Comments:     Johan Jackson RN  08/27/20 2103

## 2020-08-27 NOTE — ED NOTES
Perfect serve message to Dr. Leona Iraheta @ [address] [place] [address]  08/27/20 [place] [id number] returned the call to [facility] @ [address] [place] [address]  08/27/20 9217

## 2020-08-27 NOTE — ED PROVIDER NOTES
I independently examined and evaluated [employer]. In brief, patient is a 47-year-old female presents emergency department for evaluation of epigastric pain. Focused exam revealed soft, nondistended abdomen with tenderness to palpation over the epigastrium with guarding. Differential diagnosis includes pneumoperitoneum from perforated viscus, pancreatitis, ACS, among others. Given this, CBC, CMP, lipase, troponin, blood cultures x2, CT abdomen pelvis with IV contrast obtained. Patient given empiric antibiotics with vancomycin and Zosyn. Labs remarkable for leukocytosis, lactic of 3.7. CT showed diverticulitis of the proximal transverse colon with a large diverticulum versus contained perforation along the posterior margin of the colon. Given this, general surgeon on-call consulted. Per general surgery, no acute surgical intervention. Recommend admission for IV antibiotics. Hospitalist consulted for admission. Admit. The Ekg interpreted by me shows  Sinus tachycardia with a rate of 108  Axis is  Normal   QTc is normal   1st degree AV block      ST Segments: nonspecific st changes     All diagnostic, treatment, and disposition decisions were made by myself in conjunction with the advanced practice provider. For all further details of the patient's emergency department visit, please see the advanced practice provider's documentation. Comment: Please note this report has been produced using speech recognition software and may contain errors related to that system including errors in grammar, punctuation, and spelling, as well as words and phrases that may be inappropriate. If there are any questions or concerns please feel free to contact the dictating provider for clarification.         Charla Faustin MD  08/27/20 2377

## 2020-08-27 NOTE — CONSULTS
Department of General Surgery Consult    PATIENT NAME: Amanda Herndon   YOB: 1955    ADMISSION DATE: 8/27/2020 11:33 AM      TODAY'S DATE: 8/27/2020    Reason for Consult: Diverticulitis    Chief Complaint: Abdominal pain    Requesting Physician: Triny Monique    HISTORY OF PRESENT ILLNESS:              The patient is a 63 y.o. female who presents with abdominal pain. She states this is been going on for the past 3 days. It has gotten progressively worse. It came on suddenly with a sharp stabbing pain in the right upper quadrant. She has had nausea and vomiting as well as some fever and chills. She has a history of diverticulitis and is status post sigmoid resection. Past Medical History:        Diagnosis Date    Back pain     COPD (chronic obstructive pulmonary disease) (HCC)     Diverticulitis     Localized osteoarthrosis, lower leg 10/15/2015       Past Surgical History:        Procedure Laterality Date    COLECTOMY  9/18/2014    sigmoid    COLONOSCOPY      HYSTERECTOMY      JOINT REPLACEMENT Right 09/15/2016    RIGHT TOTAL KNEE REPLACEMENT     KNEE SURGERY         Current Medications:   Current Facility-Administered Medications: vancomycin 1.5 g in dextrose 5% 300 mL IVPB, 1,500 mg, Intravenous, Once  Prior to Admission medications    Medication Sig Start Date End Date Taking? Authorizing Provider   tiZANidine (ZANAFLEX) 4 MG tablet Take 6 mg by mouth three times daily    Yes Historical Provider, MD        Allergies:  Mobic [meloxicam] and Vicodin [hydrocodone-acetaminophen]    Social History:   TOBACCO:   reports that she has never smoked. She has never used smokeless tobacco.  ETOH:   reports no history of alcohol use. DRUGS:   reports no history of drug use.       Family History:        Problem Relation Age of Onset    Diabetes Mother     Cancer Mother     Diabetes Father     Cancer Father         Skin CA - type unsure per PT        REVIEW OF SYSTEMS:  CONSTITUTIONAL: with a walled off diverticulum versus a contained perforation on the posterior aspect of the proximal transverse colon      IMPRESSION/RECOMMENDATIONS:    Likely diverticulitis of the proximal transverse colon. Admit for IV fluid hydration, IV antibiotics, parenteral narcotics and antiemetics as needed. We will follow along, although I doubt she will need emergent surgical intervention during this admission.     Electronically signed by Jama Morse MD      [facility]  [id number]

## 2020-08-27 NOTE — PROGRESS NOTES
Patient complaining of a headache. Prn tylenol given. Patient denies any further needs at this time. Will continue to monitor.

## 2020-08-27 NOTE — PROGRESS NOTES
4 Eyes Skin Assessment     The patient is being assess for   Admission    I agree that 2 RN's have performed a thorough Head to Toe Skin Assessment on the patient. ALL assessment sites listed below have been assessed. Areas assessed by both nurses:   [x]   Head, Face, and Ears   [x]   Shoulders, Back, and Chest, Abdomen  [x]   Arms, Elbows, and Hands   [x]   Coccyx, Sacrum, and Ischium  [x]   Legs, Feet, and Heels        Scattered bruising. Small scabbed area to right buttock, dried. Small scab to lower abdomen. **SHARE this note so that the co-signing nurse is able to place an eSignature**    Co-signer eSignature: Electronically signed by Azar Lou RN on 8/27/20 at 4:35 PM EDT    Does the Patient have Skin Breakdown?   No          Jimi Prevention initiated:  NA   Wound Care Orders initiated:  NA      [facility] nurse consulted for Pressure Injury (Stage 3,4, Unstageable, DTI, NWPT, Complex wounds)and New or Established Ostomies:  NA      Primary Nurse eSignature: Electronically signed by Krzysztof Palacio RN on 8/27/20 at 4:37 PM EDT

## 2020-08-27 NOTE — ED TRIAGE NOTES
Pt reports to ED with worsening abdominal pain over  3 days. States  She previously had a \"ruptured colon: that was repaired by Dr Akbar Anaya and the pain feels the same.  Denies NVD

## 2020-08-28 LAB
ANION GAP SERPL CALCULATED.3IONS-SCNC: 6 MMOL/L (ref 3–16)
BUN BLDV-MCNC: 10 MG/DL (ref 7–20)
CALCIUM SERPL-MCNC: 8.7 MG/DL (ref 8.3–10.6)
CHLORIDE BLD-SCNC: 106 MMOL/L (ref 99–110)
CO2: 25 MMOL/L (ref 21–32)
CORTISOL - AM: 10.1 UG/DL (ref 4.3–22.4)
CREAT SERPL-MCNC: 1.1 MG/DL (ref 0.6–1.2)
EKG ATRIAL RATE: 59 BPM
EKG DIAGNOSIS: NORMAL
EKG P AXIS: 55 DEGREES
EKG P-R INTERVAL: 166 MS
EKG Q-T INTERVAL: 412 MS
EKG QRS DURATION: 72 MS
EKG QTC CALCULATION (BAZETT): 407 MS
EKG R AXIS: 68 DEGREES
EKG T AXIS: 70 DEGREES
EKG VENTRICULAR RATE: 59 BPM
GFR AFRICAN AMERICAN: >60
GFR NON-AFRICAN AMERICAN: 50
GLUCOSE BLD-MCNC: 124 MG/DL (ref 70–99)
GLUCOSE BLD-MCNC: 139 MG/DL (ref 70–99)
HCT VFR BLD CALC: 34.5 % (ref 36–48)
HEMOGLOBIN: 11.2 G/DL (ref 12–16)
MCH RBC QN AUTO: 28.6 PG (ref 26–34)
MCHC RBC AUTO-ENTMCNC: 32.5 G/DL (ref 31–36)
MCV RBC AUTO: 88 FL (ref 80–100)
PDW BLD-RTO: 14.7 % (ref 12.4–15.4)
PERFORMED ON: ABNORMAL
PLATELET # BLD: 146 K/UL (ref 135–450)
PMV BLD AUTO: 10.6 FL (ref 5–10.5)
POTASSIUM REFLEX MAGNESIUM: 4.2 MMOL/L (ref 3.5–5.1)
RBC # BLD: 3.93 M/UL (ref 4–5.2)
SODIUM BLD-SCNC: 137 MMOL/L (ref 136–145)
URINE CULTURE, ROUTINE: NORMAL
WBC # BLD: 12.8 K/UL (ref 4–11)

## 2020-08-28 PROCEDURE — 36415 COLL VENOUS BLD VENIPUNCTURE: CPT

## 2020-08-28 PROCEDURE — 80048 BASIC METABOLIC PNL TOTAL CA: CPT

## 2020-08-28 PROCEDURE — 93005 ELECTROCARDIOGRAM TRACING: CPT | Performed by: INTERNAL MEDICINE

## 2020-08-28 PROCEDURE — 6370000000 HC RX 637 (ALT 250 FOR IP): Performed by: NURSE PRACTITIONER

## 2020-08-28 PROCEDURE — 6360000002 HC RX W HCPCS: Performed by: NURSE PRACTITIONER

## 2020-08-28 PROCEDURE — 6370000000 HC RX 637 (ALT 250 FOR IP): Performed by: INTERNAL MEDICINE

## 2020-08-28 PROCEDURE — 99232 SBSQ HOSP IP/OBS MODERATE 35: CPT | Performed by: SURGERY

## 2020-08-28 PROCEDURE — 2580000003 HC RX 258: Performed by: INTERNAL MEDICINE

## 2020-08-28 PROCEDURE — 2500000003 HC RX 250 WO HCPCS: Performed by: NURSE PRACTITIONER

## 2020-08-28 PROCEDURE — 85027 COMPLETE CBC AUTOMATED: CPT

## 2020-08-28 PROCEDURE — 1200000000 HC SEMI PRIVATE

## 2020-08-28 PROCEDURE — 93010 ELECTROCARDIOGRAM REPORT: CPT | Performed by: INTERNAL MEDICINE

## 2020-08-28 PROCEDURE — 2580000003 HC RX 258: Performed by: NURSE PRACTITIONER

## 2020-08-28 PROCEDURE — 99232 SBSQ HOSP IP/OBS MODERATE 35: CPT | Performed by: INTERNAL MEDICINE

## 2020-08-28 PROCEDURE — C9113 INJ PANTOPRAZOLE SODIUM, VIA: HCPCS | Performed by: NURSE PRACTITIONER

## 2020-08-28 PROCEDURE — 82533 TOTAL CORTISOL: CPT

## 2020-08-28 RX ORDER — 0.9 % SODIUM CHLORIDE 0.9 %
1000 INTRAVENOUS SOLUTION INTRAVENOUS ONCE
Status: COMPLETED | OUTPATIENT
Start: 2020-08-28 | End: 2020-08-28

## 2020-08-28 RX ORDER — PANTOPRAZOLE SODIUM 40 MG/10ML
40 INJECTION, POWDER, LYOPHILIZED, FOR SOLUTION INTRAVENOUS DAILY
Status: DISCONTINUED | OUTPATIENT
Start: 2020-08-28 | End: 2020-08-30 | Stop reason: HOSPADM

## 2020-08-28 RX ORDER — MIDODRINE HYDROCHLORIDE 5 MG/1
5 TABLET ORAL
Status: DISCONTINUED | OUTPATIENT
Start: 2020-08-28 | End: 2020-08-29

## 2020-08-28 RX ORDER — OXYCODONE HYDROCHLORIDE AND ACETAMINOPHEN 5; 325 MG/1; MG/1
1 TABLET ORAL EVERY 6 HOURS PRN
Status: DISCONTINUED | OUTPATIENT
Start: 2020-08-28 | End: 2020-08-30 | Stop reason: HOSPADM

## 2020-08-28 RX ORDER — 0.9 % SODIUM CHLORIDE 0.9 %
500 INTRAVENOUS SOLUTION INTRAVENOUS ONCE
Status: COMPLETED | OUTPATIENT
Start: 2020-08-28 | End: 2020-08-28

## 2020-08-28 RX ORDER — 0.9 % SODIUM CHLORIDE 0.9 %
250 INTRAVENOUS SOLUTION INTRAVENOUS ONCE
Status: COMPLETED | OUTPATIENT
Start: 2020-08-28 | End: 2020-08-28

## 2020-08-28 RX ADMIN — Medication 10 ML: at 20:50

## 2020-08-28 RX ADMIN — MIDODRINE HYDROCHLORIDE 5 MG: 5 TABLET ORAL at 16:57

## 2020-08-28 RX ADMIN — METRONIDAZOLE 500 MG: 500 INJECTION, SOLUTION INTRAVENOUS at 01:11

## 2020-08-28 RX ADMIN — SODIUM CHLORIDE: 9 INJECTION, SOLUTION INTRAVENOUS at 04:03

## 2020-08-28 RX ADMIN — ONDANSETRON HYDROCHLORIDE 4 MG: 2 INJECTION, SOLUTION INTRAMUSCULAR; INTRAVENOUS at 14:23

## 2020-08-28 RX ADMIN — SODIUM CHLORIDE 250 ML: 9 INJECTION, SOLUTION INTRAVENOUS at 14:23

## 2020-08-28 RX ADMIN — MORPHINE SULFATE 4 MG: 4 INJECTION INTRAVENOUS at 16:57

## 2020-08-28 RX ADMIN — METRONIDAZOLE 500 MG: 500 INJECTION, SOLUTION INTRAVENOUS at 08:26

## 2020-08-28 RX ADMIN — PROMETHAZINE HYDROCHLORIDE 12.5 MG: 25 TABLET ORAL at 16:59

## 2020-08-28 RX ADMIN — MORPHINE SULFATE 4 MG: 4 INJECTION INTRAVENOUS at 20:55

## 2020-08-28 RX ADMIN — MIDODRINE HYDROCHLORIDE 5 MG: 5 TABLET ORAL at 08:23

## 2020-08-28 RX ADMIN — MIDODRINE HYDROCHLORIDE 5 MG: 5 TABLET ORAL at 11:37

## 2020-08-28 RX ADMIN — OXYCODONE HYDROCHLORIDE AND ACETAMINOPHEN 1 TABLET: 5; 325 TABLET ORAL at 08:23

## 2020-08-28 RX ADMIN — SODIUM CHLORIDE: 9 INJECTION, SOLUTION INTRAVENOUS at 14:24

## 2020-08-28 RX ADMIN — SODIUM CHLORIDE 500 ML: 9 INJECTION, SOLUTION INTRAVENOUS at 05:00

## 2020-08-28 RX ADMIN — METRONIDAZOLE 500 MG: 500 INJECTION, SOLUTION INTRAVENOUS at 17:01

## 2020-08-28 RX ADMIN — PANTOPRAZOLE SODIUM 40 MG: 40 INJECTION, POWDER, FOR SOLUTION INTRAVENOUS at 11:37

## 2020-08-28 RX ADMIN — CIPROFLOXACIN 400 MG: 2 INJECTION, SOLUTION INTRAVENOUS at 05:45

## 2020-08-28 RX ADMIN — MORPHINE SULFATE 4 MG: 4 INJECTION INTRAVENOUS at 02:18

## 2020-08-28 RX ADMIN — SODIUM CHLORIDE: 9 INJECTION, SOLUTION INTRAVENOUS at 20:50

## 2020-08-28 RX ADMIN — ENOXAPARIN SODIUM 40 MG: 40 INJECTION SUBCUTANEOUS at 16:57

## 2020-08-28 RX ADMIN — SODIUM CHLORIDE 1000 ML: 9 INJECTION, SOLUTION INTRAVENOUS at 06:44

## 2020-08-28 RX ADMIN — CIPROFLOXACIN 400 MG: 2 INJECTION, SOLUTION INTRAVENOUS at 17:05

## 2020-08-28 ASSESSMENT — PAIN SCALES - GENERAL
PAINLEVEL_OUTOF10: 0
PAINLEVEL_OUTOF10: 6
PAINLEVEL_OUTOF10: 7

## 2020-08-28 ASSESSMENT — PAIN DESCRIPTION - PAIN TYPE
TYPE: ACUTE PAIN
TYPE: ACUTE PAIN

## 2020-08-28 ASSESSMENT — PAIN DESCRIPTION - LOCATION
LOCATION: ABDOMEN
LOCATION: ABDOMEN

## 2020-08-28 ASSESSMENT — PAIN DESCRIPTION - DESCRIPTORS
DESCRIPTORS: ACHING;TENDER
DESCRIPTORS: ACHING

## 2020-08-28 NOTE — FLOWSHEET NOTE
08/28/20 1515   Vital Signs   Pulse 61   /65   BP Location Left upper arm   BP Upper/Lower Upper     Pt received a fluid bolus of 250 ml/hr due to low BP of 73/49. BP came up to 133/65. Patient relaxed and in bed. Denied any needs. Call light in reach.

## 2020-08-28 NOTE — FLOWSHEET NOTE
08/28/20 0708   Vital Signs   Temp 97.5 °F (36.4 °C)   Temp Source Oral   Pulse 53   Heart Rate Source Monitor   Resp 16   BP (!) 90/52   BP Location Right upper arm   BP Upper/Lower Upper   Patient Position Supine   Level of Consciousness 0   MEWS Score 2   Oxygen Therapy   SpO2 99 %   O2 Device None (Room air)     Pt is still having abdominal pain she rates a 6/10. Prn pain medication was given. BP is coming up and pt was given her scheduled midodrine this AM. Pt denies any symptoms of the low Bp. Denies any other needs. Ensured patients call light was in reach and informed her of importance of calling when she needs to use the restroom due to risk of some her bp dropping when standing. Pt verbalized understanding.

## 2020-08-28 NOTE — PROGRESS NOTES
Pt states pain of 8 0-1 scale. Gave PRN morphine. This writer requested pt to use call light is she needs to go to restroom. Call light within reach. Will continue to monitor.

## 2020-08-28 NOTE — FLOWSHEET NOTE
08/28/20 1400   Vital Signs   Temp 97 °F (36.1 °C)   Temp Source Oral   Pulse 55   Heart Rate Source Monitor   Resp 18   BP (!) 73/49   BP Location Left upper arm   BP Upper/Lower Upper   Patient Position Semi fowlers   Level of Consciousness 0   MEWS Score 3   Patient Currently in Pain Denies   Oxygen Therapy   SpO2 99 %   O2 Device None (Room air)     Dr. Rossana Carrillo is aware of the low BP. Ordered fluid bolus for patient. Patient is asymptomatic and denies any light headness. Explained need to call for assistance to bathroom due to potential of her BP dropping more and her potentially passing out. Pt acknowledged. Ensured call light in reach.

## 2020-08-28 NOTE — PROGRESS NOTES
IM Progress Note    Admit Date:  8/27/2020  1    Interval history:  Admitted for perforated diverticulitis  Fevers resolved     Subjective:  Ms. Sailaja Fajardo feels fine today. Pain only with moving or bending  Tolerating clears    BP low this am, improving with IVF boluses      Objective:   /65   Pulse 61   Temp 97 °F (36.1 °C) (Oral)   Resp 18   Ht 5' 2\" (1.575 m)   Wt 189 lb 14.4 oz (86.1 kg)   SpO2 99%   BMI 34.73 kg/m²       Intake/Output Summary (Last 24 hours) at 8/28/2020 1605  Last data filed at 8/28/2020 1336  Gross per 24 hour   Intake 120 ml   Output --   Net 120 ml       Physical Exam:        General:  Awake, alert and oriented. Appears to be not in any distress  Mucous Membranes:  Pink , anicteric  Neck: No JVD, no carotid bruit, no thyromegaly  Chest:  Clear to auscultation bilaterally, no added sounds  Cardiovascular:  RRR S1S2 heard, no murmurs or gallops  Abdomen:  Soft, undistended, + left UQ ++ tender, no organomegaly, BS present  Extremities: No edema or cyanosis.  Distal pulses well felt  Well perfused peripheries , cap refill <3 sec  Neurological : grossly normal        Medications:   Scheduled Medications:    midodrine  5 mg Oral TID WC    pantoprazole  40 mg Intravenous Daily    sodium chloride flush  10 mL Intravenous 2 times per day    enoxaparin  40 mg Subcutaneous Daily    ciprofloxacin  400 mg Intravenous Q12H    metroNIDAZOLE  500 mg Intravenous Q8H     I   sodium chloride 125 mL/hr at 08/28/20 1424     oxyCODONE-acetaminophen, sodium chloride flush, acetaminophen **OR** acetaminophen, promethazine **OR** ondansetron, potassium chloride **OR** potassium alternative oral replacement **OR** potassium chloride, morphine    Lab Data:  Recent Labs     08/27/20  1141 08/28/20  0540   WBC 19.3* 12.8*   HGB 14.7 11.2*   HCT 44.2 34.5*   MCV 87.7 88.0    146     Recent Labs     08/27/20  1141 08/28/20  0540    137   K 4.2 4.2   CL 98* 106   CO2 26 25   BUN 8 10

## 2020-08-28 NOTE — PROGRESS NOTES
Shift assessment complete. No scheduled medications are due at this time. Pt states pain of 8 0-10. Pt not due for PRN morphine until 2045. Pt stated will call out when in need. Call light within reach. Will continue to monitor.

## 2020-08-28 NOTE — PROGRESS NOTES
Assumed care of patient. In bed with c/o pain and nausea. Morphine and phenergan given as requested. Call light within reach.

## 2020-08-28 NOTE — PROGRESS NOTES
Consult has been called to Dr. Prashanth Burkett on 8/28/20. Spoke with denise.  2:19 PM    Yashira Kaplan  8/28/2020

## 2020-08-29 PROCEDURE — 2580000003 HC RX 258: Performed by: NURSE PRACTITIONER

## 2020-08-29 PROCEDURE — 6370000000 HC RX 637 (ALT 250 FOR IP): Performed by: INTERNAL MEDICINE

## 2020-08-29 PROCEDURE — 2500000003 HC RX 250 WO HCPCS: Performed by: NURSE PRACTITIONER

## 2020-08-29 PROCEDURE — 99222 1ST HOSP IP/OBS MODERATE 55: CPT | Performed by: SURGERY

## 2020-08-29 PROCEDURE — 2580000003 HC RX 258: Performed by: SURGERY

## 2020-08-29 PROCEDURE — 6360000002 HC RX W HCPCS: Performed by: NURSE PRACTITIONER

## 2020-08-29 PROCEDURE — 99232 SBSQ HOSP IP/OBS MODERATE 35: CPT | Performed by: INTERNAL MEDICINE

## 2020-08-29 PROCEDURE — C9113 INJ PANTOPRAZOLE SODIUM, VIA: HCPCS | Performed by: NURSE PRACTITIONER

## 2020-08-29 PROCEDURE — 1200000000 HC SEMI PRIVATE

## 2020-08-29 RX ADMIN — Medication 10 ML: at 08:46

## 2020-08-29 RX ADMIN — ONDANSETRON HYDROCHLORIDE 4 MG: 2 INJECTION, SOLUTION INTRAMUSCULAR; INTRAVENOUS at 19:37

## 2020-08-29 RX ADMIN — MORPHINE SULFATE 4 MG: 4 INJECTION INTRAVENOUS at 04:30

## 2020-08-29 RX ADMIN — METRONIDAZOLE 500 MG: 500 INJECTION, SOLUTION INTRAVENOUS at 17:02

## 2020-08-29 RX ADMIN — MORPHINE SULFATE 4 MG: 4 INJECTION INTRAVENOUS at 20:59

## 2020-08-29 RX ADMIN — CIPROFLOXACIN 400 MG: 2 INJECTION, SOLUTION INTRAVENOUS at 17:02

## 2020-08-29 RX ADMIN — METRONIDAZOLE 500 MG: 500 INJECTION, SOLUTION INTRAVENOUS at 00:32

## 2020-08-29 RX ADMIN — SODIUM CHLORIDE: 9 INJECTION, SOLUTION INTRAVENOUS at 21:07

## 2020-08-29 RX ADMIN — METRONIDAZOLE 500 MG: 500 INJECTION, SOLUTION INTRAVENOUS at 08:47

## 2020-08-29 RX ADMIN — ONDANSETRON HYDROCHLORIDE 4 MG: 2 INJECTION, SOLUTION INTRAMUSCULAR; INTRAVENOUS at 14:20

## 2020-08-29 RX ADMIN — PANTOPRAZOLE SODIUM 40 MG: 40 INJECTION, POWDER, FOR SOLUTION INTRAVENOUS at 08:46

## 2020-08-29 RX ADMIN — MORPHINE SULFATE 4 MG: 4 INJECTION INTRAVENOUS at 08:46

## 2020-08-29 RX ADMIN — ENOXAPARIN SODIUM 40 MG: 40 INJECTION SUBCUTANEOUS at 17:02

## 2020-08-29 RX ADMIN — OXYCODONE HYDROCHLORIDE AND ACETAMINOPHEN 1 TABLET: 5; 325 TABLET ORAL at 14:19

## 2020-08-29 RX ADMIN — Medication 10 ML: at 20:59

## 2020-08-29 RX ADMIN — MORPHINE SULFATE 4 MG: 4 INJECTION INTRAVENOUS at 00:35

## 2020-08-29 RX ADMIN — CIPROFLOXACIN 400 MG: 2 INJECTION, SOLUTION INTRAVENOUS at 04:31

## 2020-08-29 ASSESSMENT — PAIN DESCRIPTION - PROGRESSION
CLINICAL_PROGRESSION: NOT CHANGED
CLINICAL_PROGRESSION: GRADUALLY WORSENING

## 2020-08-29 ASSESSMENT — PAIN SCALES - GENERAL
PAINLEVEL_OUTOF10: 6
PAINLEVEL_OUTOF10: 7
PAINLEVEL_OUTOF10: 7
PAINLEVEL_OUTOF10: 0
PAINLEVEL_OUTOF10: 5
PAINLEVEL_OUTOF10: 6
PAINLEVEL_OUTOF10: 0
PAINLEVEL_OUTOF10: 6

## 2020-08-29 ASSESSMENT — PAIN DESCRIPTION - DESCRIPTORS
DESCRIPTORS: ACHING

## 2020-08-29 ASSESSMENT — PAIN DESCRIPTION - LOCATION
LOCATION: ABDOMEN

## 2020-08-29 ASSESSMENT — PAIN DESCRIPTION - PAIN TYPE
TYPE: ACUTE PAIN

## 2020-08-29 NOTE — PROGRESS NOTES
AM assessment completed, see flow sheet. Pt is alert and oriented. Vital signs are WNL. Respirations are even & easy on RA. Pt complaints voiced of abdomen pain 6/10 medicated see MAR. Pt denies needs at this time. SR up x 2, and bed in low position. Call light is within reach.   \

## 2020-08-29 NOTE — PROGRESS NOTES
[facility] GENERAL SURGERY DAILY PROGRESS NOTE    SUBJECTIVE: Awake, alert    OBJECTIVE: CURRENT VITALS:  BP (!) 149/72   Pulse 57   Temp 97.2 °F (36.2 °C) (Oral)   Resp 16   Ht 5' 2\" (1.575 m)   Wt 189 lb 14.4 oz (86.1 kg)   SpO2 99%   BMI 34.73 kg/m²          ABD: Soft. Mild tenderness.      LABS:    CBC:   Recent Labs     08/27/20  1141 08/28/20  0540   WBC 19.3* 12.8*   RBC 5.04 3.93*   HGB 14.7 11.2*   HCT 44.2 34.5*   MCV 87.7 88.0   RDW 15.0 14.7    146     BMP:   Recent Labs     08/27/20  1141 08/28/20  0540    137   K 4.2 4.2   CL 98* 106   CO2 26 25   BUN 8 10   CREATININE 0.9 1.1           ASSESSMENT:   Diverticulitis      PLAN:   Full liquids  Antibiotics  Home soon         [address] [facility]

## 2020-08-29 NOTE — PROGRESS NOTES
Pt has been ambulating in hallways. The patient is tolerating full liquids. Pt reports abdomen pain 5/10. Pt concerned oral pain medication will cause nausea. Pt will to try now that has had full liquids. Pt given percocet po with zofran 4 mg IVP to help with nausea. Call light in reach.

## 2020-08-29 NOTE — PROGRESS NOTES
IM Progress Note    Admit Date:  8/27/2020  2    Interval history:  Admitted for perforated diverticulitis  Fevers resolved     Subjective:  Ms. Nel Alexis feels fine today. BP improved and pain better  Wishes to ambulate  Tolerating clears  No BM yet      Objective:   /66   Pulse 73   Temp 97.5 °F (36.4 °C) (Oral)   Resp 16   Ht 5' 2\" (1.575 m)   Wt 189 lb 14.4 oz (86.1 kg)   SpO2 97%   BMI 34.73 kg/m²       Intake/Output Summary (Last 24 hours) at 8/29/2020 0810  Last data filed at 8/29/2020 0431  Gross per 24 hour   Intake 5866 ml   Output --   Net 5866 ml       Physical Exam:        General:  Awake, alert and oriented. Appears to be not in any distress  Mucous Membranes:  Pink , anicteric  Neck: No JVD, no carotid bruit, no thyromegaly  Chest:  Clear to auscultation bilaterally, no added sounds  Cardiovascular:  RRR S1S2 heard, no murmurs or gallops  Abdomen:  Soft, undistended, left UQ minimal tenderness along transverse colon, no organomegaly, BS present  Extremities: No edema or cyanosis.  Distal pulses well felt  Well perfused peripheries , cap refill <3 sec  Neurological : grossly normal        Medications:   Scheduled Medications:    midodrine  5 mg Oral TID WC    pantoprazole  40 mg Intravenous Daily    sodium chloride flush  10 mL Intravenous 2 times per day    enoxaparin  40 mg Subcutaneous Daily    ciprofloxacin  400 mg Intravenous Q12H    metroNIDAZOLE  500 mg Intravenous Q8H     I   sodium chloride 125 mL/hr at 08/28/20 2050     oxyCODONE-acetaminophen, sodium chloride flush, acetaminophen **OR** acetaminophen, promethazine **OR** ondansetron, potassium chloride **OR** potassium alternative oral replacement **OR** potassium chloride, morphine    Lab Data:  Recent Labs     08/27/20  1141 08/28/20  0540   WBC 19.3* 12.8*   HGB 14.7 11.2*   HCT 44.2 34.5*   MCV 87.7 88.0    146     Recent Labs     08/27/20  1141 08/28/20  0540    137   K 4.2 4.2   CL 98* 106   CO2 26 25   BUN 8 10   CREATININE 0.9 1.1     Recent Labs     08/27/20  1141   TROPONINI <0.01       Coagulation:   Lab Results   Component Value Date    INR 0.94 09/07/2016    APTT 37.1 09/07/2016     Cardiac markers:   Lab Results   Component Value Date    TROPONINI <0.01 08/27/2020         Lab Results   Component Value Date    ALT 11 08/27/2020    AST 10 (L) 08/27/2020    ALKPHOS 112 08/27/2020    BILITOT 0.8 08/27/2020       Lab Results   Component Value Date    INR 0.94 09/07/2016    PROTIME 10.7 09/07/2016       Radiology    CULTURES  Blood: pending  Urine: pending      EKG:  I have reviewed the EKG with the following interpretation:   Sinus tachycardia with Premature supraventricular complexes, Nonspecific T wave abnormality     RADIOLOGY  CT ABDOMEN PELVIS W IV CONTRAST Additional Contrast? None   Final Result   Findings are suggestive of diverticulitis of the proximal transverse colon   with a large diverticulum versus contained perforation along the posterior   margin of the colon. Marked associated mural thickening, inflammatory   stranding, and small amount of adjacent fluid. Thickening of the adjacent   duodenum, likely reactive. Follow-up to resolution recommended.       2.2 cm complex lesion within the spleen is larger than prior examination. Some considerations include complex cyst, hemangioma, less commonly   malignancy. In the nonacute setting, MR could be considered for further   characterization as warranted.       Urothelial enhancement of the right renal pelvis.   If  infection is of   consideration, suggest correlation with urinalysis.       Hiatal hernia.           XR CHEST PORTABLE   Final Result   No radiographic evidence of acute pulmonary disease.                     ASSESSMENT/PLAN:    Acute Diverticulitis with contained perforation   - patient presented with c/o abdominal pain, nausea, vomiting  - CT abdomen/pelvis with diverticulitis of the proximal transverse colon with large diverticulum versus contained perforation along the posterior margin.  - admitted to [facility] surgery was consulted and medical mx advised   - treated with IVF, pain meds and IV abx   - improving pain, started clears, adv  - IV Cipro, Flagyl D#3  - start ambulation, adv and dc planning     Sepsis, POA  - fever, leukocytosis, lactic acidosis, low BP  - due to Diverticulitis  - antibiotics, IVFs as above  - blood cx remain neg , BP improved  - improved     Splenic lesion  - 2.2 cm complex lesion-likely hemangioma   - needs further workup outpatient     UTI  - cover with Cipro as above  - f/u on urine cx.         Chronic back pain  - Holding home Zanaflex     COPD/asthma  - stable. No AE  - order home inhalers.      Obesity  - Body mass index is 35.67 kg/m².   - Recommended weight loss       DVT Prophylaxis: Lovenox  Diet: clears   Code Status: Full Code      Jeanette Shah MD 8/29/2020 8:10 AM

## 2020-08-29 NOTE — PROGRESS NOTES
Pt stated N/V after full liquid dinner. Pt given zofran 4 mg IVP. Report given @ bedside to [facility], for transferral of care. Pt resting in bed. Call light in reach.

## 2020-08-29 NOTE — PROGRESS NOTES
Patient care assumed, assessment completed as charted. Patient resting in bed, in no apparent distress. C/O abdominal pain, states Percocet earlier made her feel nauseated, morphine administered per PRN order. States no further needs at this time. Will monitor.

## 2020-08-30 VITALS
TEMPERATURE: 97.5 F | HEIGHT: 62 IN | OXYGEN SATURATION: 97 % | SYSTOLIC BLOOD PRESSURE: 172 MMHG | BODY MASS INDEX: 34.95 KG/M2 | RESPIRATION RATE: 16 BRPM | HEART RATE: 76 BPM | DIASTOLIC BLOOD PRESSURE: 80 MMHG | WEIGHT: 189.9 LBS

## 2020-08-30 PROCEDURE — 6370000000 HC RX 637 (ALT 250 FOR IP): Performed by: INTERNAL MEDICINE

## 2020-08-30 PROCEDURE — 2500000003 HC RX 250 WO HCPCS: Performed by: NURSE PRACTITIONER

## 2020-08-30 PROCEDURE — 6360000002 HC RX W HCPCS: Performed by: NURSE PRACTITIONER

## 2020-08-30 PROCEDURE — 2580000003 HC RX 258: Performed by: NURSE PRACTITIONER

## 2020-08-30 PROCEDURE — C9113 INJ PANTOPRAZOLE SODIUM, VIA: HCPCS | Performed by: NURSE PRACTITIONER

## 2020-08-30 PROCEDURE — 99238 HOSP IP/OBS DSCHRG MGMT 30/<: CPT | Performed by: INTERNAL MEDICINE

## 2020-08-30 RX ORDER — METRONIDAZOLE 500 MG/1
500 TABLET ORAL 3 TIMES DAILY
Qty: 21 TABLET | Refills: 0 | Status: SHIPPED | OUTPATIENT
Start: 2020-08-30 | End: 2020-09-06

## 2020-08-30 RX ORDER — OXYCODONE HYDROCHLORIDE AND ACETAMINOPHEN 5; 325 MG/1; MG/1
1 TABLET ORAL EVERY 6 HOURS PRN
Qty: 15 TABLET | Refills: 0 | Status: SHIPPED | OUTPATIENT
Start: 2020-08-30 | End: 2020-09-04

## 2020-08-30 RX ORDER — CIPROFLOXACIN 500 MG/1
500 TABLET, FILM COATED ORAL 2 TIMES DAILY
Qty: 14 TABLET | Refills: 0 | Status: SHIPPED | OUTPATIENT
Start: 2020-08-30 | End: 2020-09-06

## 2020-08-30 RX ORDER — AMLODIPINE BESYLATE 5 MG/1
5 TABLET ORAL DAILY
Qty: 30 TABLET | Refills: 0 | Status: SHIPPED | OUTPATIENT
Start: 2020-08-31

## 2020-08-30 RX ORDER — AMLODIPINE BESYLATE 5 MG/1
5 TABLET ORAL DAILY
Status: DISCONTINUED | OUTPATIENT
Start: 2020-08-30 | End: 2020-08-30 | Stop reason: HOSPADM

## 2020-08-30 RX ORDER — ONDANSETRON 4 MG/1
4 TABLET, ORALLY DISINTEGRATING ORAL EVERY 8 HOURS PRN
Qty: 15 TABLET | Refills: 0 | Status: SHIPPED | OUTPATIENT
Start: 2020-08-30 | End: 2020-09-15

## 2020-08-30 RX ADMIN — AMLODIPINE BESYLATE 5 MG: 5 TABLET ORAL at 08:37

## 2020-08-30 RX ADMIN — METRONIDAZOLE 500 MG: 500 INJECTION, SOLUTION INTRAVENOUS at 01:15

## 2020-08-30 RX ADMIN — METRONIDAZOLE 500 MG: 500 INJECTION, SOLUTION INTRAVENOUS at 08:37

## 2020-08-30 RX ADMIN — CIPROFLOXACIN 400 MG: 2 INJECTION, SOLUTION INTRAVENOUS at 04:38

## 2020-08-30 RX ADMIN — PANTOPRAZOLE SODIUM 40 MG: 40 INJECTION, POWDER, FOR SOLUTION INTRAVENOUS at 08:37

## 2020-08-30 RX ADMIN — MORPHINE SULFATE 4 MG: 4 INJECTION INTRAVENOUS at 04:38

## 2020-08-30 RX ADMIN — Medication 10 ML: at 08:37

## 2020-08-30 ASSESSMENT — PAIN SCALES - GENERAL: PAINLEVEL_OUTOF10: 7

## 2020-08-30 NOTE — PROGRESS NOTES
Pt tolerated general diet at lunch, recommended to follow low fat diet. Pt given written and verbal discharge instructions with prescriptions. Pt indicated understanding and received prescription and home medication instructions. Pt packed own belongings. Pt taken down to family car via wheelchair.

## 2020-08-30 NOTE — PROGRESS NOTES
Pt resting in bed watching TV. She c/o stomach pain 7/10, medicated with PRN morphine per MAR. Assessment complete-see flowsheet. Medications given-see MAR. Pt denies further needs, call light and bedside table within reach. Will continue to monitor.

## 2020-08-30 NOTE — DISCHARGE SUMMARY
med-surg. Physical Exam at Discharge:      General:  Awake, alert and oriented. Appears to be not in any distress  Mucous Membranes:  Pink , anicteric  Neck: No JVD, no carotid bruit, no thyromegaly  Chest:  Clear to auscultation bilaterally, no added sounds  Cardiovascular:  RRR S1S2 heard, no murmurs or gallops  Abdomen:  Soft, undistended, left UQ minimal tenderness along transverse colon, no organomegaly, BS present  Extremities: No edema or cyanosis. Distal pulses well felt  Well perfused peripheries , cap refill <3 sec  Neurological : grossly normal       Radiology (Please Review Full Report for Details)       CULTURES  Blood: pending  Urine: pending      EKG:  I have reviewed the EKG with the following interpretation:   Sinus tachycardia with Premature supraventricular complexes, Nonspecific T wave abnormality     RADIOLOGY  CT ABDOMEN PELVIS W IV CONTRAST Additional Contrast? None   Final Result   Findings are suggestive of diverticulitis of the proximal transverse colon   with a large diverticulum versus contained perforation along the posterior   margin of the colon.  Marked associated mural thickening, inflammatory   stranding, and small amount of adjacent fluid.  Thickening of the adjacent   duodenum, likely reactive.  Follow-up to resolution recommended.       2.2 cm complex lesion within the spleen is larger than prior examination. Some considerations include complex cyst, hemangioma, less commonly   malignancy.  In the nonacute setting, MR could be considered for further   characterization as warranted.       Urothelial enhancement of the right renal pelvis.  If  infection is of   consideration, suggest correlation with urinalysis.       Hiatal hernia.           XR CHEST PORTABLE   Final Result   No radiographic evidence of acute pulmonary disease.                  Consults:    1.  surgery  2.  GI                Recent Labs     08/27/20  1141 08/28/20  0540   WBC 19.3* 12.8*   HGB 14.7 11.2* HCT 44.2 34.5*   MCV 87.7 88.0    146       Recent Labs     08/27/20  1141 08/28/20  0540    137   K 4.2 4.2   CL 98* 106   CO2 26 25   BUN 8 10   CREATININE 0.9 1.1       CBC:  Lab Results   Component Value Date    WBC 12.8 08/28/2020    HGB 11.2 08/28/2020    HCT 34.5 08/28/2020    MCV 88.0 08/28/2020     08/28/2020    NEUTOPHILPCT 86.0 08/27/2020    LYMPHOPCT 7.0 08/27/2020    MONOPCT 7.0 08/27/2020    BASOPCT 0.0 08/27/2020    NEUTROABS 16.6 08/27/2020    LYMPHSABS 1.4 08/27/2020    MONOSABS 1.4 08/27/2020    EOSABS 0.0 08/27/2020    BASOSABS 0.0 08/27/2020     BNP:   Lab Results   Component Value Date     08/28/2020    K 4.2 08/28/2020    CO2 25 08/28/2020    BUN 10 08/28/2020    CREATININE 1.1 08/28/2020    CALCIUM 8.7 08/28/2020                Medication List      STOP taking these medications    ALPRAZolam 1 MG tablet  Commonly known as:  XANAX     esomeprazole Magnesium 40 MG Pack  Commonly known as:  NEXIUM     levothyroxine 75 MCG tablet  Commonly known as:  SYNTHROID     rivaroxaban 10 MG Tabs tablet  Commonly known as:  Xarelto     SUMAtriptan 50 MG tablet  Commonly known as:  IMITREX        ASK your doctor about these medications    tiZANidine 4 MG tablet  Commonly known as:  [place] Decent              Discharge Condition/Location: stable/home     Follow Up:    F/w Dr. Sujit Dodson in 2 -3 weeks    PCP in 1 weeks      Time Spent on discharge is more than 28 minutes in the examination, evaluation, counseling and review of medications and discharge plan.       Jose Hoffmann MD 8/30/2020 8:01 AM

## 2020-08-30 NOTE — PROGRESS NOTES
IM Progress Note    Admit Date:  8/27/2020  3    Interval history:  Admitted for perforated diverticulitis  Fevers resolved     Subjective:  Ms. Anneliese Rosado felt nauseated and has vomited last night and this am  BP high  Ambulated well in hallway  Pain slightly worse with vomiting only   No BM yet but wishes to eat       Objective:   BP (!) 172/80   Pulse 76   Temp 97.5 °F (36.4 °C) (Oral)   Resp 16   Ht 5' 2\" (1.575 m)   Wt 189 lb 14.4 oz (86.1 kg)   SpO2 97%   BMI 34.73 kg/m²     No intake or output data in the 24 hours ending 08/30/20 0956    Physical Exam:        General:  Awake, alert and oriented. Appears to be not in any distress  Mucous Membranes:  Pink , anicteric  Neck: No JVD, no carotid bruit, no thyromegaly  Chest:  Clear to auscultation bilaterally, no added sounds  Cardiovascular:  RRR S1S2 heard, no murmurs or gallops  Abdomen:  Soft, undistended, left UQ minimal tenderness along transverse colon, no organomegaly, BS present  Extremities: No edema or cyanosis.  Distal pulses well felt  Well perfused peripheries , cap refill <3 sec  Neurological : grossly normal        Medications:   Scheduled Medications:    amLODIPine  5 mg Oral Daily    pantoprazole  40 mg Intravenous Daily    sodium chloride flush  10 mL Intravenous 2 times per day    enoxaparin  40 mg Subcutaneous Daily    ciprofloxacin  400 mg Intravenous Q12H    metroNIDAZOLE  500 mg Intravenous Q8H     I   sodium chloride 60 mL/hr at 08/29/20 2107     oxyCODONE-acetaminophen, sodium chloride flush, acetaminophen **OR** acetaminophen, promethazine **OR** ondansetron, potassium chloride **OR** potassium alternative oral replacement **OR** potassium chloride, morphine    Lab Data:  Recent Labs     08/27/20  1141 08/28/20  0540   WBC 19.3* 12.8*   HGB 14.7 11.2*   HCT 44.2 34.5*   MCV 87.7 88.0    146     Recent Labs     08/27/20  1141 08/28/20  0540    137   K 4.2 4.2   CL 98* 106   CO2 26 25   BUN 8 10   CREATININE 0.9 1.1 perforation along the posterior margin.  - admitted to [facility] surgery was consulted and medical mx advised   - treated with IVF, pain meds and IV abx   - improving pain, started clears, adv to gen  - IV Cipro, Flagyl D#4  - start ambulation, adv and dc planning     Sepsis, POA  - fever, leukocytosis, lactic acidosis, low BP  - due to Diverticulitis  - antibiotics, IVFs as above  - blood cx remain neg , BP improved  - improved     Splenic lesion  - 2.2 cm complex lesion-likely hemangioma   - needs further workup outpatient     UTI ruled out      [facility] - was on losartan in the past, off meds for few yrs. Start on norvasc    Chronic back pain  - Holding home Zanaflex     COPD/asthma  - stable. No AE  - order home inhalers.      Obesity  - Body mass index is 35.67 kg/m².   - Recommended weight loss       DVT Prophylaxis: Lovenox  Diet: clears   Code Status: Full Code    Dc planning ok     Jessika Rizvi MD 8/30/2020 9:56 AM

## 2020-08-30 NOTE — PLAN OF CARE
Problem: Infection:  Goal: Will remain free from infection  Description: Will remain free from infection  Outcome: Ongoing     Problem: Safety:  Goal: Free from accidental physical injury  Description: Free from accidental physical injury  Outcome: Ongoing  Goal: Free from intentional harm  Description: Free from intentional harm  Outcome: Ongoing     Problem: Daily Care:  Goal: Daily care needs are met  Description: Daily care needs are met  Outcome: Ongoing     Problem: Pain:  Goal: Patient's pain/discomfort is manageable  Description: Patient's pain/discomfort is manageable  Outcome: Ongoing     Problem: Discharge Planning:  Goal: Patients continuum of care needs are met  Description: Patients continuum of care needs are met  Outcome: Ongoing
Problem: Infection:  Goal: Will remain free from infection  Description: Will remain free from infection  Outcome: Ongoing   Pt continues to receive IV antibiotics due to the diverticulosis. Problem: Safety:  Goal: Free from accidental physical injury  Description: Free from accidental physical injury  Outcome: Ongoing   Educated patient on the need to call out when ambulating to the bathroom due to her low BP which can cause patient to be lightheaded or risk of passing out. Pt is so far asymptomatic however there is still a risk and explained this to patient. Problem: Pain:  Goal: Patient's pain/discomfort is manageable  Description: Patient's pain/discomfort is manageable  Outcome: Ongoing   Pt has not been able to take much pain medication due to her low BP. So unable to really manage patients pain. Pt did have some tylenol.
Ongoing

## 2020-08-31 LAB — BLOOD CULTURE, ROUTINE: NORMAL

## 2020-09-01 ENCOUNTER — OFFICE VISIT (OUTPATIENT)
Dept: SURGERY | Age: 65
End: 2020-09-01
Payer: MEDICARE

## 2020-09-01 VITALS
BODY MASS INDEX: 34.08 KG/M2 | HEIGHT: 62 IN | WEIGHT: 185.2 LBS | TEMPERATURE: 98.1 F | DIASTOLIC BLOOD PRESSURE: 76 MMHG | SYSTOLIC BLOOD PRESSURE: 126 MMHG

## 2020-09-01 PROCEDURE — 99213 OFFICE O/P EST LOW 20 MIN: CPT | Performed by: SURGERY

## 2020-09-01 PROCEDURE — 1036F TOBACCO NON-USER: CPT | Performed by: SURGERY

## 2020-09-01 PROCEDURE — 1111F DSCHRG MED/CURRENT MED MERGE: CPT | Performed by: SURGERY

## 2020-09-01 PROCEDURE — G8427 DOCREV CUR MEDS BY ELIG CLIN: HCPCS | Performed by: SURGERY

## 2020-09-01 PROCEDURE — G8417 CALC BMI ABV UP PARAM F/U: HCPCS | Performed by: SURGERY

## 2020-09-01 PROCEDURE — 3017F COLORECTAL CA SCREEN DOC REV: CPT | Performed by: SURGERY

## 2020-09-01 NOTE — PROGRESS NOTES
[facility]    CHIEF COMPLAINT: Abdominal pain    SUBJECTIVE:   Patient presents for follow up of hepatic flexure diverticulitis. She reports persistent pain that is slowly getting better. She denies nausea or vomiting. Allergies   Allergen Reactions    Sertraline Other (See Comments)     Memory loss    Hydrocodone-Acetaminophen Other (See Comments)    Meloxicam Other (See Comments)     Stomach distress per PT     Vicodin [Hydrocodone-Acetaminophen] Rash     Tolerates plain Acetaminophen and Percocet. Outpatient Medications Marked as Taking for the 9/1/20 encounter (Office Visit) with Darshan Grissom MD   Medication Sig Dispense Refill    oxyCODONE-acetaminophen (PERCOCET) 5-325 MG per tablet Take 1 tablet by mouth every 6 hours as needed for Pain for up to 5 days.  1 po q 6 prn 15 tablet 0    amLODIPine (NORVASC) 5 MG tablet Take 1 tablet by mouth daily 30 tablet 0    ciprofloxacin (CIPRO) 500 MG tablet Take 1 tablet by mouth 2 times daily for 7 days 14 tablet 0    metroNIDAZOLE (FLAGYL) 500 MG tablet Take 1 tablet by mouth 3 times daily for 7 days 21 tablet 0    ondansetron (ZOFRAN ODT) 4 MG disintegrating tablet Take 1 tablet by mouth every 8 hours as needed for Nausea or Vomiting 15 tablet 0    tiZANidine (ZANAFLEX) 4 MG tablet Take 6 mg by mouth three times daily          Past Medical History:   Diagnosis Date    Back pain     COPD (chronic obstructive pulmonary disease) (HCC)     Diverticulitis     Localized osteoarthrosis, lower leg 10/15/2015     Past Surgical History:   Procedure Laterality Date    COLECTOMY  9/18/2014    sigmoid    COLONOSCOPY      HYSTERECTOMY      JOINT REPLACEMENT Right 09/15/2016    RIGHT TOTAL KNEE REPLACEMENT     KNEE SURGERY       Family History   Problem Relation Age of Onset    Diabetes Mother     Cancer Mother     Diabetes Father     Cancer Father         Skin CA - type unsure per PT      Social History     Socioeconomic History    Marital status: Legally      Spouse name: Not on file    Number of children: Not on file    Years of education: Not on file    Highest education level: Not on file   Occupational History    Not on file   Social Needs    Financial resource strain: Not on file    Food insecurity     Worry: Not on file     Inability: Not on file    Transportation needs     Medical: Not on file     Non-medical: Not on file   Tobacco Use    Smoking status: Never Smoker    Smokeless tobacco: Never Used   Substance and Sexual Activity    Alcohol use: No    Drug use: No    Sexual activity: Yes   Lifestyle    Physical activity     Days per week: Not on file     Minutes per session: Not on file    Stress: Not on file   Relationships    Social connections     Talks on phone: Not on file     Gets together: Not on file     Attends Latter-day service: Not on file     Active member of club or organization: Not on file     Attends meetings of clubs or organizations: Not on file     Relationship status: Not on file    Intimate partner violence     Fear of current or ex partner: Not on file     Emotionally abused: Not on file     Physically abused: Not on file     Forced sexual activity: Not on file   Other Topics Concern    Not on file   Social History Narrative    Not on file          Vitals:    09/01/20 0952   BP: 126/76   Site: Left Upper Arm   Position: Sitting   Cuff Size: Large Adult   Temp: 98.1 °F (36.7 °C)   TempSrc: Temporal   Weight: 185 lb 3.2 oz (84 kg)   Height: 5' 2\" (1.575 m)     Body mass index is 33.87 kg/m². ROS:  As per HPI, otherwise reviewed and negative    PHYSICAL EXAM:     Constitutional:  Well developed, well nourished, no acute distress, non-toxic appearance   Respiratory:  No respiratory distress, normal breath sounds, no rales, no wheezing   Cardiovascular:  Normal rate, normal rhythm, no murmurs.   GI: Bowel sounds positive, soft, tender in the right upper quadrant  Integument: Warm and dry  Neurologic:  Alert & oriented x 3, normal motor function, normal sensory function, no focal deficits noted   Psychiatric:  Speech and behavior appropriate             DATA:  CT ordered    ASSESSMENT:   1. Diverticulitis of colon           PLAN: She is slowly getting better however is having persistent pain. She may be developing an abscess. Follow-up CT scan will be obtained next week with a follow-up office visit afterwards.

## 2020-09-11 ENCOUNTER — TELEPHONE (OUTPATIENT)
Dept: SURGERY | Age: 65
End: 2020-09-11

## 2020-09-11 ENCOUNTER — HOSPITAL ENCOUNTER (OUTPATIENT)
Dept: CT IMAGING | Age: 65
Discharge: HOME | End: 2020-09-11
Payer: MEDICARE

## 2020-09-11 PROCEDURE — 74177 CT ABD & PELVIS W/CONTRAST: CPT

## 2020-09-11 PROCEDURE — 6360000004 HC RX CONTRAST MEDICATION: Performed by: SURGERY

## 2020-09-11 RX ADMIN — IOPAMIDOL 75 ML: 755 INJECTION, SOLUTION INTRAVENOUS at 13:59

## 2020-09-11 NOTE — TELEPHONE ENCOUNTER
CT shows near complete resolution of inflammation.  Have her follow-up for a final check in a couple weeks

## 2020-09-11 NOTE — TELEPHONE ENCOUNTER
Pt notified her CT shows improvement, follow up in a few weeks to re check, call the office if anything worsens.

## 2020-09-15 ENCOUNTER — OFFICE VISIT (OUTPATIENT)
Dept: SURGERY | Age: 65
End: 2020-09-15
Payer: MEDICARE

## 2020-09-15 VITALS
WEIGHT: 186.7 LBS | HEIGHT: 62 IN | DIASTOLIC BLOOD PRESSURE: 76 MMHG | TEMPERATURE: 97.5 F | BODY MASS INDEX: 34.36 KG/M2 | SYSTOLIC BLOOD PRESSURE: 136 MMHG

## 2020-09-15 PROCEDURE — 1111F DSCHRG MED/CURRENT MED MERGE: CPT | Performed by: SURGERY

## 2020-09-15 PROCEDURE — G8417 CALC BMI ABV UP PARAM F/U: HCPCS | Performed by: SURGERY

## 2020-09-15 PROCEDURE — G8427 DOCREV CUR MEDS BY ELIG CLIN: HCPCS | Performed by: SURGERY

## 2020-09-15 PROCEDURE — 1036F TOBACCO NON-USER: CPT | Performed by: SURGERY

## 2020-09-15 PROCEDURE — 3017F COLORECTAL CA SCREEN DOC REV: CPT | Performed by: SURGERY

## 2020-09-15 PROCEDURE — 99213 OFFICE O/P EST LOW 20 MIN: CPT | Performed by: SURGERY

## 2020-09-15 NOTE — PROGRESS NOTES
[facility]    CHIEF COMPLAINT: None, feels better    SUBJECTIVE:   Patient presents for follow up of her diverticulitis. She reports doing much better. She denies any nausea or vomiting. She has been eating. Her pain is improving significantly. Allergies   Allergen Reactions    Sertraline Other (See Comments)     Memory loss    Hydrocodone-Acetaminophen Other (See Comments)    Meloxicam Other (See Comments)     Stomach distress per PT     Vicodin [Hydrocodone-Acetaminophen] Rash     Tolerates plain Acetaminophen and Percocet.      Outpatient Medications Marked as Taking for the 9/15/20 encounter (Office Visit) with Yoon Allen MD   Medication Sig Dispense Refill    amLODIPine (NORVASC) 5 MG tablet Take 1 tablet by mouth daily 30 tablet 0    tiZANidine (ZANAFLEX) 4 MG tablet Take 6 mg by mouth three times daily          Past Medical History:   Diagnosis Date    Back pain     COPD (chronic obstructive pulmonary disease) ([facility] [address]     Diverticulitis     Localized osteoarthrosis, lower leg 10/15/2015     Past Surgical History:   Procedure Laterality Date    COLECTOMY  9/18/2014    sigmoid    COLONOSCOPY      HYSTERECTOMY      JOINT REPLACEMENT Right 09/15/2016    RIGHT TOTAL KNEE REPLACEMENT     KNEE SURGERY       Family History   Problem Relation Age of Onset    Diabetes Mother     Cancer Mother     Diabetes Father     Cancer Father         Skin CA - type unsure per PT      Social History     Socioeconomic History    Marital status: Legally      Spouse name: Not on file    Number of children: Not on file    Years of education: Not on file    Highest education level: Not on file   Occupational History    Not on file   Social Needs    Financial resource strain: Not on file    Food insecurity     Worry: Not on file     Inability: Not on file    Transportation needs     Medical: Not on file     Non-medical: Not on file   Tobacco Use    Smoking status: Never Smoker    Smokeless tobacco: Never Used   Substance and Sexual Activity    Alcohol use: No    Drug use: No    Sexual activity: Yes   Lifestyle    Physical activity     Days per week: Not on file     Minutes per session: Not on file    Stress: Not on file   Relationships    Social connections     Talks on phone: Not on file     Gets together: Not on file     Attends Pentecostalism service: Not on file     Active member of club or organization: Not on file     Attends meetings of clubs or organizations: Not on file     Relationship status: Not on file    Intimate partner violence     Fear of current or ex partner: Not on file     Emotionally abused: Not on file     Physically abused: Not on file     Forced sexual activity: Not on file   Other Topics Concern    Not on file   Social History Narrative    Not on file          Vitals:    09/15/20 0931   BP: 136/76   Site: Left Upper Arm   Position: Sitting   Cuff Size: Large Adult   Temp: 97.5 °F (36.4 °C)   TempSrc: Temporal   Weight: 186 lb 11.2 oz (84.7 kg)   Height: 5' 2\" (1.575 m)     Body mass index is 34.15 kg/m². ROS:  As per HPI, otherwise reviewed and negative    PHYSICAL EXAM:     Constitutional:  Well developed, well nourished, no acute distress, non-toxic appearance   Respiratory:  No respiratory distress, normal breath sounds, no rales, no wheezing   Cardiovascular:  Normal rate, normal rhythm, no murmurs. GI: Bowel sounds positive, soft, minimal right upper quadrant tenderness  Integument: Warm and dry  Neurologic:  Alert & oriented x 3, normal motor function, normal sensory function, no focal deficits noted   Psychiatric:  Speech and behavior appropriate             DATA:  Radiology Review: CT images reviewed and show near complete resolution of the diverticulitis    ASSESSMENT:   1. Diverticulitis of colon           PLAN: At this point, I reassured her that her diverticulitis is improving.   She is given a switch to a high-fiber diet with a fiber supplement. She will follow-up as needed.

## 2020-09-25 ENCOUNTER — TELEPHONE (OUTPATIENT)
Dept: SURGERY | Age: 65
End: 2020-09-25

## 2020-09-25 NOTE — TELEPHONE ENCOUNTER
Called and spoke to pt- she has been having some nausea and vomiting thinks it is her gallbladder- she is also still very tender from diverticulitis, requested appt to come back in.

## 2021-01-26 ENCOUNTER — TELEPHONE (OUTPATIENT)
Dept: SURGERY | Age: 66
End: 2021-01-26

## 2021-01-26 DIAGNOSIS — K57.92 DIVERTICULITIS: Primary | ICD-10-CM

## 2021-01-26 RX ORDER — AMOXICILLIN AND CLAVULANATE POTASSIUM 875; 125 MG/1; MG/1
1 TABLET, FILM COATED ORAL 2 TIMES DAILY
Qty: 20 TABLET | Refills: 0 | Status: SHIPPED | OUTPATIENT
Start: 2021-01-26 | End: 2021-02-05

## 2021-01-26 NOTE — TELEPHONE ENCOUNTER
I called and spoke with pt- advised to start Augmentin today- and follow up with CT scan, scheduled for [facility] 2/1/2021 1:45 arrival time for 3 pm test. NPO 4 hours prior.

## 2021-01-26 NOTE — TELEPHONE ENCOUNTER
Pt having flare up of diverticulitis, asking if antibiotics can be called  In or if needs appt.  She uses [facility] in [place]

## 2021-02-02 ENCOUNTER — HOSPITAL ENCOUNTER (OUTPATIENT)
Age: 66
Discharge: HOME | End: 2021-02-02
Payer: MEDICARE

## 2021-02-02 ENCOUNTER — HOSPITAL ENCOUNTER (OUTPATIENT)
Dept: CT IMAGING | Age: 66
Discharge: HOME | End: 2021-02-02
Payer: MEDICARE

## 2021-02-02 DIAGNOSIS — K57.92 DIVERTICULITIS: ICD-10-CM

## 2021-02-02 LAB
ANION GAP SERPL CALCULATED.3IONS-SCNC: 9 MMOL/L (ref 3–16)
BUN BLDV-MCNC: 13 MG/DL (ref 7–20)
CALCIUM SERPL-MCNC: 10.4 MG/DL (ref 8.3–10.6)
CHLORIDE BLD-SCNC: 106 MMOL/L (ref 99–110)
CO2: 27 MMOL/L (ref 21–32)
CREAT SERPL-MCNC: 1 MG/DL (ref 0.6–1.2)
GFR AFRICAN AMERICAN: >60
GFR NON-AFRICAN AMERICAN: 56
GLUCOSE BLD-MCNC: 115 MG/DL (ref 70–99)
POTASSIUM SERPL-SCNC: 4.2 MMOL/L (ref 3.5–5.1)
SODIUM BLD-SCNC: 142 MMOL/L (ref 136–145)

## 2021-02-02 PROCEDURE — 80048 BASIC METABOLIC PNL TOTAL CA: CPT

## 2021-02-02 PROCEDURE — 74177 CT ABD & PELVIS W/CONTRAST: CPT

## 2021-02-02 PROCEDURE — 36415 COLL VENOUS BLD VENIPUNCTURE: CPT

## 2021-02-02 PROCEDURE — 6360000004 HC RX CONTRAST MEDICATION: Performed by: SURGERY

## 2021-02-02 RX ADMIN — IOHEXOL 50 ML: 240 INJECTION, SOLUTION INTRATHECAL; INTRAVASCULAR; INTRAVENOUS; ORAL at 14:37

## 2021-02-02 RX ADMIN — IOPAMIDOL 75 ML: 755 INJECTION, SOLUTION INTRAVENOUS at 14:37

## 2021-02-05 ENCOUNTER — TELEPHONE (OUTPATIENT)
Dept: SURGERY | Age: 66
End: 2021-02-05

## 2021-02-05 NOTE — TELEPHONE ENCOUNTER
----- Message from Diana Scott MD sent at 2/5/2021  8:22 AM EST -----  Please call with CT-recurrent diverticulitis, and have her come see me next week.

## 2021-02-23 ENCOUNTER — OFFICE VISIT (OUTPATIENT)
Dept: SURGERY | Age: 66
End: 2021-02-23
Payer: MEDICARE

## 2021-02-23 VITALS
WEIGHT: 194.2 LBS | SYSTOLIC BLOOD PRESSURE: 135 MMHG | TEMPERATURE: 97.7 F | DIASTOLIC BLOOD PRESSURE: 86 MMHG | HEIGHT: 62 IN | BODY MASS INDEX: 35.74 KG/M2

## 2021-02-23 DIAGNOSIS — K57.32 DIVERTICULITIS OF COLON: Primary | ICD-10-CM

## 2021-02-23 PROCEDURE — G8427 DOCREV CUR MEDS BY ELIG CLIN: HCPCS | Performed by: SURGERY

## 2021-02-23 PROCEDURE — 1036F TOBACCO NON-USER: CPT | Performed by: SURGERY

## 2021-02-23 PROCEDURE — G8417 CALC BMI ABV UP PARAM F/U: HCPCS | Performed by: SURGERY

## 2021-02-23 PROCEDURE — 3017F COLORECTAL CA SCREEN DOC REV: CPT | Performed by: SURGERY

## 2021-02-23 PROCEDURE — G8484 FLU IMMUNIZE NO ADMIN: HCPCS | Performed by: SURGERY

## 2021-02-23 PROCEDURE — 99213 OFFICE O/P EST LOW 20 MIN: CPT | Performed by: SURGERY

## 2021-02-23 RX ORDER — ALBUTEROL SULFATE 90 UG/1
2 AEROSOL, METERED RESPIRATORY (INHALATION) EVERY 6 HOURS PRN
COMMUNITY
Start: 2020-07-08

## 2021-02-23 RX ORDER — BUDESONIDE AND FORMOTEROL FUMARATE DIHYDRATE 160; 4.5 UG/1; UG/1
AEROSOL RESPIRATORY (INHALATION)
COMMUNITY
Start: 2021-02-19

## 2021-02-23 RX ORDER — TIOTROPIUM BROMIDE INHALATION SPRAY 3.12 UG/1
SPRAY, METERED RESPIRATORY (INHALATION)
COMMUNITY
Start: 2021-02-19

## 2021-02-23 NOTE — PROGRESS NOTES
[facility]    CHIEF COMPLAINT: Abdominal pain    SUBJECTIVE:   Patient presents for follow up of her diverticulitis. She reports feeling better since taking the antibiotics. She still does have some mild left lower quadrant and right upper quadrant pain. She denies nausea or vomiting. Her bowels are working well. She has never started taking the fiber supplement, although she did buy it. Allergies   Allergen Reactions    Sertraline Other (See Comments)     Memory loss    Hydrocodone-Acetaminophen Other (See Comments)    Meloxicam Other (See Comments)     Stomach distress per PT     Vicodin [Hydrocodone-Acetaminophen] Rash     Tolerates plain Acetaminophen and Percocet.      Outpatient Medications Marked as Taking for the 2/23/21 encounter (Office Visit) with Chata Clarke MD   Medication Sig Dispense Refill    albuterol sulfate  (90 Base) MCG/ACT inhaler Inhale 2 puffs into the lungs every 6 hours as needed      SPIRIVA RESPIMAT 2.5 MCG/ACT AERS inhaler       SYMBICORT 160-4.5 MCG/ACT AERO       amLODIPine (NORVASC) 5 MG tablet Take 1 tablet by mouth daily 30 tablet 0    tiZANidine (ZANAFLEX) 4 MG tablet Take 6 mg by mouth three times daily          Past Medical History:   Diagnosis Date    Back pain     COPD (chronic obstructive pulmonary disease) (HCC)     Diverticulitis     Localized osteoarthrosis, lower leg 10/15/2015     Past Surgical History:   Procedure Laterality Date    COLECTOMY  9/18/2014    sigmoid    COLONOSCOPY      HYSTERECTOMY      JOINT REPLACEMENT Right 09/15/2016    RIGHT TOTAL KNEE REPLACEMENT     KNEE SURGERY       Family History   Problem Relation Age of Onset    Diabetes Mother     Cancer Mother     Diabetes Father     Cancer Father         Skin CA - type unsure per PT      Social History     Socioeconomic History    Marital status: Legally      Spouse name: Not on file    Number of children: Not on file    Years of education: Not on file    Highest education level: Not on file   Occupational History    Not on file   Social Needs    Financial resource strain: Not on file    Food insecurity     Worry: Not on file     Inability: Not on file    Transportation needs     Medical: Not on file     Non-medical: Not on file   Tobacco Use    Smoking status: Never Smoker    Smokeless tobacco: Never Used   Substance and Sexual Activity    Alcohol use: No    Drug use: No    Sexual activity: Yes   Lifestyle    Physical activity     Days per week: Not on file     Minutes per session: Not on file    Stress: Not on file   Relationships    Social connections     Talks on phone: Not on file     Gets together: Not on file     Attends Alevism service: Not on file     Active member of club or organization: Not on file     Attends meetings of clubs or organizations: Not on file     Relationship status: Not on file    Intimate partner violence     Fear of current or ex partner: Not on file     Emotionally abused: Not on file     Physically abused: Not on file     Forced sexual activity: Not on file   Other Topics Concern    Not on file   Social History Narrative    Not on file          Vitals:    02/23/21 1012   BP: 135/86   Site: Right Wrist   Position: Sitting   Cuff Size: Large Adult   Temp: 97.7 °F (36.5 °C)   TempSrc: Temporal   Weight: 194 lb 3.2 oz (88.1 kg)   Height: 5' 2\" (1.575 m)     Body mass index is 35.52 kg/m². ROS:  As per HPI, otherwise reviewed and negative    PHYSICAL EXAM:     Constitutional:  Well developed, well nourished, no acute distress, non-toxic appearance   Respiratory:  No respiratory distress, normal breath sounds, no rales, no wheezing   Cardiovascular:  Normal rate, normal rhythm, no murmurs.   GI: All sounds positive, soft, mild tenderness in the left lower quadrant and right upper quadrant  Integument: Warm and dry  Neurologic:  Alert & oriented x 3, normal motor function, normal sensory function, no focal deficits noted   Psychiatric:  Speech and behavior appropriate             DATA:  Radiology Review: CT images reviewed and show diverticulitis of the sigmoid colon as well as the hepatic flexure    ASSESSMENT:   1. Diverticulitis of colon           PLAN: She is symptomatically improved since the most recent attack. I again strongly encouraged her to follow a high-fiber diet and take the fiber supplement regularly. We discussed the fact that if she does need surgery, she would likely need a subtotal colectomy given the 2 focal areas of diverticular disease.   She will follow her diet more strictly and follow-up as needed

## 2021-08-12 ENCOUNTER — HOSPITAL ENCOUNTER (OUTPATIENT)
Dept: CT IMAGING | Age: 66
Discharge: HOME | End: 2021-08-12
Payer: MEDICARE

## 2021-08-12 ENCOUNTER — TELEPHONE (OUTPATIENT)
Dept: SURGERY | Age: 66
End: 2021-08-12

## 2021-08-12 ENCOUNTER — OFFICE VISIT (OUTPATIENT)
Dept: SURGERY | Age: 66
End: 2021-08-12
Payer: MEDICARE

## 2021-08-12 VITALS
WEIGHT: 184 LBS | DIASTOLIC BLOOD PRESSURE: 80 MMHG | HEIGHT: 62 IN | SYSTOLIC BLOOD PRESSURE: 132 MMHG | BODY MASS INDEX: 33.86 KG/M2

## 2021-08-12 DIAGNOSIS — K57.32 DIVERTICULITIS OF LARGE INTESTINE WITHOUT BLEEDING, UNSPECIFIED COMPLICATION STATUS: Primary | ICD-10-CM

## 2021-08-12 DIAGNOSIS — K57.32 DIVERTICULITIS OF LARGE INTESTINE WITHOUT BLEEDING, UNSPECIFIED COMPLICATION STATUS: ICD-10-CM

## 2021-08-12 DIAGNOSIS — R10.13 EPIGASTRIC PAIN: Primary | ICD-10-CM

## 2021-08-12 PROCEDURE — 1036F TOBACCO NON-USER: CPT | Performed by: SURGERY

## 2021-08-12 PROCEDURE — G8417 CALC BMI ABV UP PARAM F/U: HCPCS | Performed by: SURGERY

## 2021-08-12 PROCEDURE — 3017F COLORECTAL CA SCREEN DOC REV: CPT | Performed by: SURGERY

## 2021-08-12 PROCEDURE — 74176 CT ABD & PELVIS W/O CONTRAST: CPT

## 2021-08-12 PROCEDURE — G8427 DOCREV CUR MEDS BY ELIG CLIN: HCPCS | Performed by: SURGERY

## 2021-08-12 PROCEDURE — 99214 OFFICE O/P EST MOD 30 MIN: CPT | Performed by: SURGERY

## 2021-08-12 RX ORDER — AMOXICILLIN AND CLAVULANATE POTASSIUM 875; 125 MG/1; MG/1
1 TABLET, FILM COATED ORAL 2 TIMES DAILY
Qty: 20 TABLET | Refills: 0 | Status: SHIPPED | OUTPATIENT
Start: 2021-08-12 | End: 2021-08-22

## 2021-08-12 NOTE — PROGRESS NOTES
[facility]    CHIEF COMPLAINT: Abdominal pain    SUBJECTIVE:   Patient presents for follow up of her diverticulitis. She reports having recent problems with abdominal pain. This started a few days ago. It has gotten progressively worse. She has crampy pain in the epigastrium and left lower quadrant. She has some nausea but no vomiting. She denies fever or chills. She has been taking her fiber occasionally but has not been especially compliant with this. Allergies   Allergen Reactions    Sertraline Other (See Comments)     Memory loss    Hydrocodone-Acetaminophen Other (See Comments)    Meloxicam Other (See Comments)     Stomach distress per PT     Vicodin [Hydrocodone-Acetaminophen] Rash     Tolerates plain Acetaminophen and Percocet.      Outpatient Medications Marked as Taking for the 8/12/21 encounter (Office Visit) with Zander Trimble MD   Medication Sig Dispense Refill    amoxicillin-clavulanate (AUGMENTIN) 875-125 MG per tablet Take 1 tablet by mouth 2 times daily for 10 days 20 tablet 0    albuterol sulfate  (90 Base) MCG/ACT inhaler Inhale 2 puffs into the lungs every 6 hours as needed      SPIRIVA RESPIMAT 2.5 MCG/ACT AERS inhaler       SYMBICORT 160-4.5 MCG/ACT AERO       tiZANidine (ZANAFLEX) 4 MG tablet Take 6 mg by mouth three times daily          Past Medical History:   Diagnosis Date    Back pain     COPD (chronic obstructive pulmonary disease) (HCC)     Diverticulitis     Localized osteoarthrosis, lower leg 10/15/2015     Past Surgical History:   Procedure Laterality Date    COLECTOMY  9/18/2014    sigmoid    COLONOSCOPY      HYSTERECTOMY      JOINT REPLACEMENT Right 09/15/2016    RIGHT TOTAL KNEE REPLACEMENT     KNEE SURGERY       Family History   Problem Relation Age of Onset    Diabetes Mother     Cancer Mother     Diabetes Father     Cancer Father         Skin CA - type unsure per PT      Social History     Socioeconomic History    Marital status: Legally      Spouse name: Not on file    Number of children: Not on file    Years of education: Not on file    Highest education level: Not on file   Occupational History    Not on file   Tobacco Use    Smoking status: Never Smoker    Smokeless tobacco: Never Used   Vaping Use    Vaping Use: Never used   Substance and Sexual Activity    Alcohol use: No    Drug use: No    Sexual activity: Yes   Other Topics Concern    Not on file   Social History Narrative    Not on file     Social Determinants of Health     Financial Resource Strain:     Difficulty of Paying Living Expenses:    Food Insecurity:     Worried About Running Out of Food in the Last Year:     [address] in the Last Year:    Transportation Needs:     Lack of Transportation (Medical):  Lack of Transportation (Non-Medical):    Physical Activity:     Days of Exercise per Week:     Minutes of Exercise per Session:    Stress:     Feeling of Stress :    Social Connections:     Frequency of Communication with Friends and Family:     Frequency of Social Gatherings with Friends and Family:     Attends [employer] Services:     Active Member of Clubs or Organizations:     Attends Club or Organization Meetings:     Marital Status:    Intimate Partner Violence:     Fear of Current or Ex-Partner:     Emotionally Abused:     Physically Abused:     Sexually Abused:           Vitals:    08/12/21 0849   BP: 132/80   Site: Left Upper Arm   Position: Sitting   Cuff Size: Large Adult   Weight: 184 lb (83.5 kg)   Height: 5' 2\" (1.575 m)     Body mass index is 33.65 kg/m². ROS:  As per HPI, otherwise reviewed and negative    PHYSICAL EXAM:     Constitutional:  Well developed, well nourished, no acute distress, non-toxic appearance   Respiratory:  No respiratory distress, normal breath sounds, no rales, no wheezing   Cardiovascular:  Normal rate, normal rhythm, no murmurs.   GI: Bowel sounds positive, soft, tender in the epigastrium and left lower quadrant  Integument: warm and dry  Neurologic:  Alert & oriented x 3, normal motor function, normal sensory function, no focal deficits noted   Psychiatric:  Speech and behavior appropriate             DATA:  Radiology Review: CT scan ordered    ASSESSMENT:   1. Diverticulitis of large intestine without bleeding, unspecified complication status           PLAN: Suspect she is having a diverticulitis flare. She will be started on antibiotics. CT scan will be obtained for more definitive evaluation.   Given her recurrent symptoms throughout her colon, she may eventually need subtotal colectomy with ileorectal anastomosis

## 2021-08-12 NOTE — TELEPHONE ENCOUNTER
Called pt - advised per Dr. Davonte Lockhart CT is OK do not start antibiotics yet- and we will follow up with a Gallbladder Ultrasound, I will get this scheduled and call pt back.

## 2021-08-12 NOTE — TELEPHONE ENCOUNTER
Left pt a detailed message US of gallbladder scheduled  For 8/19/21 5pm arrival, 5:30 test, NPO 8 hours prior, [facility].

## 2021-08-13 ENCOUNTER — TELEPHONE (OUTPATIENT)
Dept: SURGERY | Age: 66
End: 2021-08-13

## 2021-08-13 NOTE — TELEPHONE ENCOUNTER
I called and spoke with pt she is feeling some better today, still with epigastric pain, she decided to start antibiotics anyway to see if it helps, I advised diet change since we are working her up for possible gallbladder issues, I also advised to eat something before she takes it, and she has Zofran to take as needed for nausea.

## 2021-08-13 NOTE — TELEPHONE ENCOUNTER
Pt called in stating she was in a lot of pain and asked if there was anything she could do to help with this discomfort until her ultrasound. Callback is [phone].

## 2021-08-19 ENCOUNTER — HOSPITAL ENCOUNTER (OUTPATIENT)
Dept: ULTRASOUND IMAGING | Age: 66
Discharge: HOME | End: 2021-08-19
Payer: MEDICARE

## 2021-08-19 DIAGNOSIS — R10.13 EPIGASTRIC PAIN: ICD-10-CM

## 2021-08-19 PROCEDURE — 76705 ECHO EXAM OF ABDOMEN: CPT

## 2021-08-23 DIAGNOSIS — R10.13 EPIGASTRIC PAIN: Primary | ICD-10-CM

## 2021-08-23 DIAGNOSIS — K82.8 THICKENING OF WALL OF GALLBLADDER: ICD-10-CM

## 2021-08-30 ENCOUNTER — HOSPITAL ENCOUNTER (OUTPATIENT)
Dept: NUCLEAR MEDICINE | Age: 66
Discharge: HOME | End: 2021-08-30
Payer: MEDICARE

## 2021-08-30 DIAGNOSIS — R10.13 EPIGASTRIC PAIN: ICD-10-CM

## 2021-08-30 DIAGNOSIS — K82.8 THICKENING OF WALL OF GALLBLADDER: ICD-10-CM

## 2021-08-30 PROCEDURE — 3430000000 HC RX DIAGNOSTIC RADIOPHARMACEUTICAL: Performed by: SURGERY

## 2021-08-30 PROCEDURE — 78227 HEPATOBIL SYST IMAGE W/DRUG: CPT

## 2021-08-30 PROCEDURE — A9537 TC99M MEBROFENIN: HCPCS | Performed by: SURGERY

## 2021-08-30 RX ADMIN — Medication 5.9 MILLICURIE: at 07:45

## 2021-11-02 ENCOUNTER — HOSPITAL ENCOUNTER (OUTPATIENT)
Dept: CT IMAGING | Age: 66
Discharge: HOME | End: 2021-11-02
Payer: MEDICARE

## 2021-11-02 DIAGNOSIS — M17.12 PRIMARY OSTEOARTHRITIS OF LEFT KNEE: ICD-10-CM

## 2021-11-02 PROCEDURE — 73700 CT LOWER EXTREMITY W/O DYE: CPT

## 2023-07-12 PROBLEM — M75.80 ROTATOR CUFF TENDINITIS: Status: ACTIVE | Noted: 2018-07-10

## 2023-07-12 PROBLEM — R73.03 PREDIABETES: Status: ACTIVE | Noted: 2019-10-14

## 2023-07-12 PROBLEM — M17.12 PRIMARY OSTEOARTHRITIS OF LEFT KNEE: Status: ACTIVE | Noted: 2019-05-10

## 2023-07-12 PROBLEM — M70.61 TROCHANTERIC BURSITIS OF RIGHT HIP: Status: ACTIVE | Noted: 2019-05-10

## 2023-07-12 PROBLEM — M17.0 PRIMARY OSTEOARTHRITIS OF BOTH KNEES: Status: ACTIVE | Noted: 2019-10-11

## 2023-07-12 PROBLEM — F41.1 ANXIETY STATE: Status: ACTIVE | Noted: 2020-04-20

## 2023-07-12 PROBLEM — M75.120 FULL THICKNESS ROTATOR CUFF TEAR: Status: ACTIVE | Noted: 2018-07-17

## 2023-07-12 PROBLEM — K21.9 GERD WITHOUT ESOPHAGITIS: Status: ACTIVE | Noted: 2020-04-20

## 2023-07-12 PROBLEM — I10 ESSENTIAL HYPERTENSION: Status: ACTIVE | Noted: 2019-10-11

## 2023-07-12 PROBLEM — E03.9 HYPOTHYROIDISM, ACQUIRED: Status: ACTIVE | Noted: 2019-10-11

## 2023-07-12 PROBLEM — M70.62 TROCHANTERIC BURSITIS OF LEFT HIP: Status: ACTIVE | Noted: 2019-05-10

## 2023-07-12 PROBLEM — S46.919A SHOULDER STRAIN: Status: ACTIVE | Noted: 2018-08-28

## 2023-07-12 PROBLEM — S43.439A INJURY OF SUPERIOR GLENOID LABRUM OF SHOULDER JOINT: Status: ACTIVE | Noted: 2018-07-10

## 2023-07-12 PROBLEM — M54.12 CERVICAL RADICULOPATHY: Status: ACTIVE | Noted: 2018-10-25

## 2023-07-13 ENCOUNTER — INITIAL CONSULT (OUTPATIENT)
Dept: SURGERY | Age: 68
End: 2023-07-13

## 2023-07-13 ENCOUNTER — TELEPHONE (OUTPATIENT)
Dept: SURGERY | Age: 68
End: 2023-07-13

## 2023-07-13 VITALS
HEIGHT: 62 IN | SYSTOLIC BLOOD PRESSURE: 110 MMHG | WEIGHT: 181 LBS | BODY MASS INDEX: 33.31 KG/M2 | DIASTOLIC BLOOD PRESSURE: 74 MMHG

## 2023-07-13 DIAGNOSIS — R10.13 EPIGASTRIC PAIN: Primary | ICD-10-CM

## 2023-07-13 RX ORDER — OMEPRAZOLE 40 MG/1
CAPSULE, DELAYED RELEASE ORAL
COMMUNITY
Start: 2023-07-11

## 2023-07-13 RX ORDER — ONDANSETRON HYDROCHLORIDE 8 MG/1
TABLET, FILM COATED ORAL
COMMUNITY
Start: 2023-07-11

## 2023-07-13 NOTE — PROGRESS NOTES
[facility]    CHIEF COMPLAINT: Abdominal pain    SUBJECTIVE:   Patient presents for follow up of her abdominal pain. She reports she is now having pain in the epigastrium that radiates to the right upper quadrant and into her back. She has associated nausea and vomiting and the vomit smells terrible. She has foul burps as well. No fever or chills. Eating seems to make the pain worse. Allergies   Allergen Reactions    Sertraline Other (See Comments)     Memory loss    Hydrocodone-Acetaminophen Other (See Comments), Nausea Only and Rash     Tolerates plain Acetaminophen and Percocet. Meloxicam Other (See Comments) and Nausea Only     Stomach distress per PT   Stomach distress per PT     Vicodin [Hydrocodone-Acetaminophen] Rash     Tolerates plain Acetaminophen and Percocet.      Outpatient Medications Marked as Taking for the 7/13/23 encounter (Initial consult) with Sherly Ceja MD   Medication Sig Dispense Refill    albuterol sulfate  (90 Base) MCG/ACT inhaler Inhale 2 puffs into the lungs every 6 hours as needed      SPIRIVA RESPIMAT 2.5 MCG/ACT AERS inhaler       SYMBICORT 160-4.5 MCG/ACT AERO       tiZANidine (ZANAFLEX) 4 MG tablet Take 1.5 tablets by mouth three times daily         Past Medical History:   Diagnosis Date    Back pain     COPD (chronic obstructive pulmonary disease) ([address])     Diverticulitis     Essential hypertension 10/11/2019    Hypothyroidism, acquired 10/11/2019    Localized osteoarthrosis, lower leg 10/15/2015     Past Surgical History:   Procedure Laterality Date    COLECTOMY  9/18/2014    sigmoid    COLONOSCOPY      HYSTERECTOMY (CERVIX STATUS UNKNOWN)      JOINT REPLACEMENT Right 09/15/2016    RIGHT TOTAL KNEE REPLACEMENT     KNEE SURGERY       Family History   Problem Relation Age of Onset    Diabetes Mother     Cancer Mother     Diabetes Father     Cancer Father         Skin CA - type unsure per PT      Social History     Socioeconomic History

## 2023-07-31 ENCOUNTER — TELEPHONE (OUTPATIENT)
Dept: SURGERY | Age: 68
End: 2023-07-31

## 2024-10-07 NOTE — PROGRESS NOTES
Goal Outcome Evaluation:  Plan of Care Reviewed With: patient        Progress: improving                                   Pt BP is 88/59  after NS 500ml bolus. Messaged Dr. Yessi Grant updating current BP. Call light within reach. Will continue to monitor.